# Patient Record
Sex: FEMALE | Race: WHITE | NOT HISPANIC OR LATINO | Employment: FULL TIME | URBAN - NONMETROPOLITAN AREA
[De-identification: names, ages, dates, MRNs, and addresses within clinical notes are randomized per-mention and may not be internally consistent; named-entity substitution may affect disease eponyms.]

---

## 2023-02-27 PROBLEM — E55.9 VITAMIN D DEFICIENCY: Status: ACTIVE | Noted: 2023-02-27

## 2023-02-27 PROBLEM — R50.9 FEVER: Status: RESOLVED | Noted: 2023-02-27 | Resolved: 2023-02-27

## 2023-02-27 PROBLEM — I10 HTN (HYPERTENSION): Status: ACTIVE | Noted: 2023-02-27

## 2023-02-27 PROBLEM — E53.8 VITAMIN B12 DEFICIENCY: Status: ACTIVE | Noted: 2023-02-27

## 2023-02-27 PROBLEM — G37.3 TRANSVERSE MYELITIS (MULTI): Status: ACTIVE | Noted: 2023-02-27

## 2023-02-27 PROBLEM — N85.6 ASHERMAN SYNDROME: Status: ACTIVE | Noted: 2023-02-27

## 2023-02-27 PROBLEM — R93.89 ABNORMAL MRI: Status: RESOLVED | Noted: 2023-02-27 | Resolved: 2023-02-27

## 2023-02-27 PROBLEM — C73 THYROID CANCER (MULTI): Status: ACTIVE | Noted: 2023-02-27

## 2023-02-27 PROBLEM — G43.909 MIGRAINE HEADACHE: Status: ACTIVE | Noted: 2023-02-27

## 2023-02-27 PROBLEM — M25.50 MULTIPLE JOINT PAIN: Status: ACTIVE | Noted: 2023-02-27

## 2023-02-27 PROBLEM — R20.2 NUMBNESS AND TINGLING OF BOTH UPPER EXTREMITIES: Status: ACTIVE | Noted: 2023-02-27

## 2023-02-27 PROBLEM — R05.9 COUGH: Status: RESOLVED | Noted: 2023-02-27 | Resolved: 2023-02-27

## 2023-02-27 PROBLEM — G62.9 NEUROPATHY: Status: ACTIVE | Noted: 2023-02-27

## 2023-02-27 PROBLEM — N97.1: Status: ACTIVE | Noted: 2023-02-27

## 2023-02-27 PROBLEM — R51.9 HEADACHE: Status: ACTIVE | Noted: 2023-02-27

## 2023-02-27 PROBLEM — R20.0 NUMBNESS AND TINGLING OF BOTH UPPER EXTREMITIES: Status: ACTIVE | Noted: 2023-02-27

## 2023-02-27 PROBLEM — G35 MULTIPLE SCLEROSIS (MULTI): Status: ACTIVE | Noted: 2023-02-27

## 2023-02-27 PROBLEM — E03.9 HYPOTHYROIDISM: Status: ACTIVE | Noted: 2023-02-27

## 2023-02-27 PROBLEM — J06.9 VIRAL URI WITH COUGH: Status: RESOLVED | Noted: 2023-02-27 | Resolved: 2023-02-27

## 2023-02-27 PROBLEM — M54.2 NECK PAIN: Status: ACTIVE | Noted: 2023-02-27

## 2023-02-27 RX ORDER — LEVOTHYROXINE SODIUM 137 UG/1
1 TABLET ORAL DAILY
COMMUNITY
Start: 2018-06-08

## 2023-02-27 RX ORDER — ACETAMINOPHEN, DEXTROMETHORPHAN HBR, DOXYLAMINE SUCCINATE, PHENYLEPHRINE HCL 650; 20; 12.5; 1 MG/30ML; MG/30ML; MG/30ML; MG/30ML
SOLUTION ORAL
COMMUNITY
Start: 2021-12-30

## 2023-02-27 RX ORDER — LISINOPRIL 10 MG/1
20 TABLET ORAL DAILY
COMMUNITY
Start: 2022-09-23 | End: 2023-03-28

## 2023-02-27 RX ORDER — ERGOCALCIFEROL 1.25 MG/1
1 CAPSULE ORAL
COMMUNITY
Start: 2021-06-21

## 2023-03-19 ENCOUNTER — TELEPHONE (OUTPATIENT)
Dept: PRIMARY CARE | Facility: CLINIC | Age: 43
End: 2023-03-19
Payer: COMMERCIAL

## 2023-03-19 NOTE — TELEPHONE ENCOUNTER
"Concern for high blood pressure reading - 158/95. Took BP several times  \"about 100 times\" tonight with 140s/80s- 150s/90.   Missed lisinopril dose yesterday. Took dose tonight at 5pm.   Mild global headache tonight. No focal neuro deficits. No oliguria or SOB or CP. No visual changes.     Stay on same consistent dosing schedule at same 20mg every day dose lisinopril. No changes. Can call PCP for BP check this week in office.  "

## 2023-03-21 ENCOUNTER — TELEPHONE (OUTPATIENT)
Dept: PRIMARY CARE | Facility: CLINIC | Age: 43
End: 2023-03-21
Payer: COMMERCIAL

## 2023-03-21 NOTE — TELEPHONE ENCOUNTER
PT calling back she left a message Tues and never received a call back. She would like to know what to do.    Pt calling her bp has been running 150//95. She takes Lisinopril 20mg. She isn't sure if she needs an appointment or just increase her meds.

## 2023-03-28 ENCOUNTER — OFFICE VISIT (OUTPATIENT)
Dept: PRIMARY CARE | Facility: CLINIC | Age: 43
End: 2023-03-28
Payer: COMMERCIAL

## 2023-03-28 VITALS
SYSTOLIC BLOOD PRESSURE: 147 MMHG | BODY MASS INDEX: 33.13 KG/M2 | HEART RATE: 87 BPM | OXYGEN SATURATION: 98 % | WEIGHT: 181.13 LBS | DIASTOLIC BLOOD PRESSURE: 91 MMHG

## 2023-03-28 DIAGNOSIS — I10 HYPERTENSION, UNSPECIFIED TYPE: Primary | ICD-10-CM

## 2023-03-28 DIAGNOSIS — R51.9 ACUTE NONINTRACTABLE HEADACHE, UNSPECIFIED HEADACHE TYPE: ICD-10-CM

## 2023-03-28 PROCEDURE — 99213 OFFICE O/P EST LOW 20 MIN: CPT | Performed by: FAMILY MEDICINE

## 2023-03-28 PROCEDURE — 1036F TOBACCO NON-USER: CPT | Performed by: FAMILY MEDICINE

## 2023-03-28 PROCEDURE — 3077F SYST BP >= 140 MM HG: CPT | Performed by: FAMILY MEDICINE

## 2023-03-28 PROCEDURE — 3080F DIAST BP >= 90 MM HG: CPT | Performed by: FAMILY MEDICINE

## 2023-03-28 RX ORDER — LISINOPRIL 20 MG/1
20 TABLET ORAL DAILY
Qty: 90 TABLET | Refills: 1
Start: 2023-03-28 | End: 2023-05-06 | Stop reason: SDUPTHER

## 2023-03-28 ASSESSMENT — ENCOUNTER SYMPTOMS
ABDOMINAL PAIN: 0
NUMBNESS: 0
DIARRHEA: 0
NAUSEA: 0
DYSURIA: 0
UNEXPECTED WEIGHT CHANGE: 0
DIFFICULTY URINATING: 0
FEVER: 0
COUGH: 0
WEAKNESS: 0
DIZZINESS: 0
TROUBLE SWALLOWING: 0
BLOOD IN STOOL: 0
CHILLS: 0
CONFUSION: 0
VOMITING: 0
WHEEZING: 0
HYPERTENSION: 1
LIGHT-HEADEDNESS: 0
SHORTNESS OF BREATH: 0

## 2023-03-28 NOTE — PATIENT INSTRUCTIONS
Please bring your blood pressure cuff in to the office to compare against our cuff, to ensure your cuff's accuracy.    Please keep track of your blood pressure (BP) and heart rate (HR) at home, and call the office in 2 weeks with your numbers.    Please return for a follow-up appointment in 3 months, earlier if any question or concern.   
oligohydramnios

## 2023-03-28 NOTE — PROGRESS NOTES
Subjective   Patient ID: Marilou Singh is a 43 y.o. female who presents for Hypertension (Pt presents complaining of blood pressure being high, she has not checked it but a few times when she felt it was high.BL).  Hypertension  Pertinent negatives include no chest pain or shortness of breath.     C/o headache for 2 days, felt as if her BP was high. Checked. -160 for a week, the week after a week of vacation, yesterday now down to 133 SBP. During vacation, was eating out at restaurants a lot and drinking much more caffeine than usual. Had headache for 2 days after returning from vacation and stopping the excess caffeine consumption.   Denies headache currently, chest pain, SOB, dyspnea, faintness/dizziness/lightheadedness, NV.     Has been taking Lisinopril 20 mg as prescribed. Had not been checking BP for a while.    Review of Systems   Constitutional:  Negative for chills, fever and unexpected weight change.   HENT:  Negative for ear pain and trouble swallowing.    Respiratory:  Negative for cough, shortness of breath and wheezing.    Cardiovascular:  Negative for chest pain.   Gastrointestinal:  Negative for abdominal pain, blood in stool, diarrhea, nausea and vomiting.   Genitourinary:  Negative for difficulty urinating and dysuria.   Skin:  Negative for rash.   Neurological:  Negative for dizziness, syncope, weakness, light-headedness and numbness.   Psychiatric/Behavioral:  Negative for behavioral problems and confusion.        Objective   Physical Exam  Vitals and nursing note reviewed.   Constitutional:       General: She is not in acute distress.     Appearance: Normal appearance.   HENT:      Head: Normocephalic and atraumatic.   Eyes:      General: No scleral icterus.     Extraocular Movements: Extraocular movements intact.      Conjunctiva/sclera: Conjunctivae normal.   Cardiovascular:      Rate and Rhythm: Normal rate and regular rhythm.      Heart sounds: Normal heart sounds.   Pulmonary:       Effort: Pulmonary effort is normal.      Breath sounds: Normal breath sounds.   Skin:     General: Skin is warm and dry.      Coloration: Skin is not jaundiced.   Neurological:      Mental Status: She is alert and oriented to person, place, and time. Mental status is at baseline.   Psychiatric:         Mood and Affect: Mood normal.         Thought Content: Thought content normal.         Assessment/Plan   Diagnoses and all orders for this visit:  Hypertension, unspecified type  Comments:  Transient increase likely secondary to increased caffeine and sodium intake related to vacation. Monitor. May consider increasing Lisinopril.  Orders:  -     lisinopril 20 mg tablet; Take 1 tablet (20 mg) by mouth once daily.  Acute nonintractable headache, unspecified headache type  Comments:  Resolved. Secondary to caffeine withdrawal +/- mild hypertension.  Other orders  -     Follow Up In Primary Care; Future

## 2023-05-06 ENCOUNTER — TELEPHONE (OUTPATIENT)
Dept: PRIMARY CARE | Facility: CLINIC | Age: 43
End: 2023-05-06
Payer: COMMERCIAL

## 2023-05-06 DIAGNOSIS — I10 HYPERTENSION, UNSPECIFIED TYPE: ICD-10-CM

## 2023-05-06 RX ORDER — LISINOPRIL 20 MG/1
20 TABLET ORAL DAILY
Qty: 30 TABLET | Refills: 1 | Status: SHIPPED | OUTPATIENT
Start: 2023-05-06 | End: 2023-06-26

## 2023-05-06 NOTE — TELEPHONE ENCOUNTER
Pt calling for refill she is out of meds.    Lisinopril 20mg  1 PO every day  30 DAYS    [employer]

## 2023-06-16 LAB
THYROTROPIN (MIU/L) IN SER/PLAS BY DETECTION LIMIT <= 0.05 MIU/L: 0.11 MIU/L (ref 0.44–3.98)
THYROXINE (T4) FREE (NG/DL) IN SER/PLAS: 1.73 NG/DL (ref 0.61–1.12)

## 2023-06-19 LAB
THYROGLOBULIN AB (IU/ML) IN SER/PLAS: <0.9 IU/ML (ref 0–4)
THYROGLOBULIN LC-MS/MS: ABNORMAL NG/ML (ref 1.3–31.8)
THYROGLOBULIN: 0.1 NG/ML (ref 1.3–31.8)

## 2023-06-23 PROBLEM — K13.0 LESION OF LIP: Status: ACTIVE | Noted: 2023-06-23

## 2023-06-26 DIAGNOSIS — I10 HYPERTENSION, UNSPECIFIED TYPE: ICD-10-CM

## 2023-06-26 RX ORDER — LISINOPRIL 20 MG/1
TABLET ORAL
Qty: 30 TABLET | Refills: 0 | Status: SHIPPED | OUTPATIENT
Start: 2023-06-26 | End: 2023-06-28 | Stop reason: SDUPTHER

## 2023-06-26 RX ORDER — LEVOTHYROXINE SODIUM 125 UG/1
125 TABLET ORAL DAILY
COMMUNITY
Start: 2023-06-20 | End: 2024-05-24

## 2023-06-28 ENCOUNTER — OFFICE VISIT (OUTPATIENT)
Dept: PRIMARY CARE | Facility: CLINIC | Age: 43
End: 2023-06-28
Payer: COMMERCIAL

## 2023-06-28 VITALS
SYSTOLIC BLOOD PRESSURE: 126 MMHG | HEIGHT: 61 IN | HEART RATE: 68 BPM | OXYGEN SATURATION: 98 % | BODY MASS INDEX: 33.49 KG/M2 | DIASTOLIC BLOOD PRESSURE: 78 MMHG | WEIGHT: 177.38 LBS

## 2023-06-28 DIAGNOSIS — I10 HYPERTENSION, UNSPECIFIED TYPE: ICD-10-CM

## 2023-06-28 DIAGNOSIS — R51.9 FACIAL PAIN: Primary | ICD-10-CM

## 2023-06-28 PROCEDURE — 1036F TOBACCO NON-USER: CPT | Performed by: FAMILY MEDICINE

## 2023-06-28 PROCEDURE — 3074F SYST BP LT 130 MM HG: CPT | Performed by: FAMILY MEDICINE

## 2023-06-28 PROCEDURE — 99214 OFFICE O/P EST MOD 30 MIN: CPT | Performed by: FAMILY MEDICINE

## 2023-06-28 PROCEDURE — 3078F DIAST BP <80 MM HG: CPT | Performed by: FAMILY MEDICINE

## 2023-06-28 RX ORDER — LISINOPRIL 20 MG/1
20 TABLET ORAL DAILY
Qty: 90 TABLET | Refills: 1 | Status: SHIPPED | OUTPATIENT
Start: 2023-06-28 | End: 2023-12-23 | Stop reason: SDUPTHER

## 2023-06-28 ASSESSMENT — ENCOUNTER SYMPTOMS
CHILLS: 0
VOMITING: 0
ABDOMINAL PAIN: 0
DIARRHEA: 0
SHORTNESS OF BREATH: 0
UNEXPECTED WEIGHT CHANGE: 0
DYSURIA: 0
LIGHT-HEADEDNESS: 0
DIFFICULTY URINATING: 0
NAUSEA: 0
WEAKNESS: 0
CONFUSION: 0
SPEECH DIFFICULTY: 0
NUMBNESS: 1
WHEEZING: 0
DIZZINESS: 0
TROUBLE SWALLOWING: 1
SORE THROAT: 0
FEVER: 0
COUGH: 0
BLOOD IN STOOL: 0

## 2023-06-28 NOTE — PROGRESS NOTES
"Subjective   Patient ID: Marilou Singh is a 43 y.o. female who presents for skin discomfort (Pt presents stating on Memorial Day weekend she started with numbness in R  side of mouth was unable to taste, thinks it is getting a little better, sees neuro tomorrow, pt then states that she had sharp needle pricking  pain above and around R eye Monday.BL).  HPI  Late May, woke up with numbness on the inside of her mouth on the right. Thinks this has started to clear up. Can feel hot and cold again.    Monday started to get pain in right upper face, a little on Tuesday. Today hasn't felt it.    Denies vision or hearing change over last few months.  Denies new rash or skin change.    Tried nothing.    Has an appointment with neurology tomorrow, Dr. Mcclain. Saw a neurologist in 2015, had some question whether or not multiple sclerosis.      Review of Systems   Constitutional:  Negative for chills, fever and unexpected weight change.   HENT:  Positive for trouble swallowing (now resolved). Negative for ear pain and sore throat.    Respiratory:  Negative for cough, shortness of breath and wheezing.    Cardiovascular:  Negative for chest pain.   Gastrointestinal:  Negative for abdominal pain, blood in stool, diarrhea, nausea and vomiting.   Genitourinary:  Negative for difficulty urinating and dysuria.   Skin:  Negative for rash.   Neurological:  Positive for numbness. Negative for dizziness, syncope, speech difficulty, weakness and light-headedness.   Psychiatric/Behavioral:  Negative for behavioral problems and confusion.          Objective   /78 Comment: ours  Pulse 68   Ht 1.543 m (5' 0.75\")   Wt 80.5 kg (177 lb 6 oz)   SpO2 98%   BMI 33.79 kg/m²     Physical Exam  Vitals and nursing note reviewed.   Constitutional:       General: She is not in acute distress.     Appearance: Normal appearance.   HENT:      Head: Normocephalic and atraumatic.      Right Ear: Tympanic membrane, ear canal and external ear normal. "      Left Ear: Tympanic membrane, ear canal and external ear normal.   Eyes:      General: No scleral icterus.     Extraocular Movements: Extraocular movements intact.      Conjunctiva/sclera: Conjunctivae normal.   Pulmonary:      Effort: Pulmonary effort is normal. No respiratory distress.   Skin:     General: Skin is warm and dry.      Coloration: Skin is not jaundiced.   Neurological:      Mental Status: She is alert and oriented to person, place, and time. Mental status is at baseline.      Cranial Nerves: Cranial nerve deficit (right inferior alveolar nerve altered sensation, perhaps slightly in a small area of CN-V1, otherwise no cranial nerve deficit; no temporal tenderness) present.   Psychiatric:         Mood and Affect: Mood normal.         Thought Content: Thought content normal.         Assessment/Plan   Problem List Items Addressed This Visit       HTN (hypertension)    Relevant Medications    lisinopril 20 mg tablet    Facial pain - Primary     Most recently symptoms in CN-V1, oral symptoms CN-V3. Unclear etiology. Keep appointment with neurology tomorrow.

## 2023-06-28 NOTE — PATIENT INSTRUCTIONS
Please return for a follow-up appointment in 6 months, earlier if any question or concern.    For assistance with scheduling referrals or consultations, please call [phone]. For laboratory, radiology, and other tests, please call [phone] ([phone] for pediatrics). Please review prescription inserts and published information for possible adverse effects. Return after testing or consultation to review results and recommendations, if symptoms persist, change, worsen, or return, or if you have any question or concern. For non-emergencies, you may call the office. For emergencies, call 9-1-1 or go to the nearest Emergency Department. Please schedule additional appointment(s) to address concern(s) not addressed today.    In general, results will not be released or discussed over the telephone. Results of tests done through [facility] are released via  [employer]:  [email or web address]    Until we complete our transition to the new system, additional information can be found at [email or web address] or on your Android or iOS (iPhone, iPad) device using the [email or web address] luly available free of charge in your device's luly store.

## 2023-06-28 NOTE — ASSESSMENT & PLAN NOTE
Most recently symptoms in CN-V1, oral symptoms CN-V3. Unclear etiology. Keep appointment with neurology tomorrow.

## 2023-07-25 LAB
CALCIDIOL (25 OH VITAMIN D3) (NG/ML) IN SER/PLAS: 40 NG/ML
COBALAMIN (VITAMIN B12) (PG/ML) IN SER/PLAS: 981 PG/ML (ref 211–911)

## 2023-08-29 LAB — THYROTROPIN (MIU/L) IN SER/PLAS BY DETECTION LIMIT <= 0.05 MIU/L: 1.24 MIU/L (ref 0.44–3.98)

## 2023-11-13 ENCOUNTER — TELEPHONE (OUTPATIENT)
Dept: NEUROLOGY | Facility: CLINIC | Age: 43
End: 2023-11-13
Payer: COMMERCIAL

## 2023-11-13 DIAGNOSIS — G35 MULTIPLE SCLEROSIS (MULTI): Primary | ICD-10-CM

## 2023-11-13 NOTE — PROGRESS NOTES
Chief complaint:        History of Present Illness:       There were no vitals taken for this visit.     Physical examination:      Neurologic Exam       No diagnosis found.       No orders of the defined types were placed in this encounter.         Impression and Plan:      Kiah Mcclain MD

## 2023-11-14 ENCOUNTER — TELEPHONE (OUTPATIENT)
Dept: NEUROLOGY | Facility: CLINIC | Age: 43
End: 2023-11-14
Payer: COMMERCIAL

## 2023-12-22 DIAGNOSIS — I10 HYPERTENSION, UNSPECIFIED TYPE: ICD-10-CM

## 2023-12-22 PROBLEM — R42 VERTIGO: Status: ACTIVE | Noted: 2023-12-22

## 2023-12-22 PROBLEM — R20.0 RIGHT FACIAL NUMBNESS: Status: ACTIVE | Noted: 2023-12-22

## 2023-12-22 RX ORDER — CHOLECALCIFEROL (VITAMIN D3) 50 MCG
TABLET ORAL
COMMUNITY
Start: 2023-06-29

## 2023-12-23 RX ORDER — LISINOPRIL 20 MG/1
20 TABLET ORAL DAILY
Qty: 30 TABLET | Refills: 0 | Status: SHIPPED | OUTPATIENT
Start: 2023-12-23 | End: 2024-02-09 | Stop reason: SDUPTHER

## 2024-02-05 ENCOUNTER — HOSPITAL ENCOUNTER (OUTPATIENT)
Dept: RADIOLOGY | Facility: HOSPITAL | Age: 44
Discharge: HOME | End: 2024-02-05
Payer: COMMERCIAL

## 2024-02-05 DIAGNOSIS — G35 MULTIPLE SCLEROSIS (MULTI): ICD-10-CM

## 2024-02-05 PROCEDURE — 70553 MRI BRAIN STEM W/O & W/DYE: CPT

## 2024-02-05 PROCEDURE — 70553 MRI BRAIN STEM W/O & W/DYE: CPT | Performed by: RADIOLOGY

## 2024-02-05 PROCEDURE — A9575 INJ GADOTERATE MEGLUMI 0.1ML: HCPCS | Performed by: PSYCHIATRY & NEUROLOGY

## 2024-02-05 PROCEDURE — 2500000004 HC RX 250 GENERAL PHARMACY W/ HCPCS (ALT 636 FOR OP/ED): Performed by: PSYCHIATRY & NEUROLOGY

## 2024-02-05 RX ORDER — GADOTERATE MEGLUMINE 376.9 MG/ML
15 INJECTION INTRAVENOUS
Status: COMPLETED | OUTPATIENT
Start: 2024-02-05 | End: 2024-02-05

## 2024-02-05 RX ADMIN — GADOTERATE MEGLUMINE 15 ML: 376.9 INJECTION INTRAVENOUS at 16:31

## 2024-02-09 ENCOUNTER — TELEPHONE (OUTPATIENT)
Dept: PRIMARY CARE | Facility: CLINIC | Age: 44
End: 2024-02-09
Payer: COMMERCIAL

## 2024-02-09 DIAGNOSIS — I10 HYPERTENSION, UNSPECIFIED TYPE: ICD-10-CM

## 2024-02-11 RX ORDER — LISINOPRIL 20 MG/1
20 TABLET ORAL DAILY
Qty: 90 TABLET | Refills: 0 | Status: SHIPPED | OUTPATIENT
Start: 2024-02-11 | End: 2024-05-06

## 2024-02-12 ENCOUNTER — OFFICE VISIT (OUTPATIENT)
Dept: NEUROLOGY | Facility: CLINIC | Age: 44
End: 2024-02-12
Payer: COMMERCIAL

## 2024-02-12 DIAGNOSIS — G35 MULTIPLE SCLEROSIS (MULTI): Primary | ICD-10-CM

## 2024-02-12 PROCEDURE — 1036F TOBACCO NON-USER: CPT | Performed by: PSYCHIATRY & NEUROLOGY

## 2024-02-12 PROCEDURE — 99214 OFFICE O/P EST MOD 30 MIN: CPT | Performed by: PSYCHIATRY & NEUROLOGY

## 2024-02-12 NOTE — PROGRESS NOTES
Chief complaint:    MS    History of Present Illness:   Marilou feels that she has fully recovered from the MS attack last summer that caused numbness on her right face and mouth. Sometimes she wonders if her sense of taste is not quite normal. Her  once commented that the food she cooked was very spicy, which she did not agree with, but also food that she thought was very spicy was not to her . She got Covid three years ago with relatively mild symptoms that did not affect taste or smell.    She has not had new symptoms. Vision is good. She has not had trouble walking. No problems with bladder control. 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 She does not feel that she has unusual fatigue. She generally sleeps well although her  may wake up and even a slight crack of light will wake her up.       Physical examination:  She is a pleasant, healthy-appearing woman     Neurologic Exam     Mental Status   She spoke with an accent but her speech was fluent and easy to understand. She had no trouble answering questions and following directions.      Cranial Nerves     CN VII   Facial expression full, symmetric.     Gait, Coordination, and Reflexes Her gait was narrow-based and steady.           1. Multiple sclerosis (CMS/HCC)               No orders of the defined types were placed in this encounter.         Impression and Plan:  Marilou had an episode of transverse myelitis in 2014 and an episode of right facial numbness in 2023. MRI of the brain in 2023 showed an enhancing lesion in the right superior cerebellar peduncle. We reviewed her recent MRI of the brain which showed no new lesion and no enhancing lesions. Her overall lesion burden is mild and she is asymptomatic. We again discussed the role of medication to prevent further attacks. Marilou prefers to wait and see how she does with serial MRIs of the brain. She takes her vitamins  B12 and D. I encouraged her to work on losing 10-20 pounds. We discussed the importance of good quality sleep. I will refer her to a new MS specialist since I will be retiring in September.     Kiah Mcclain MD

## 2024-02-12 NOTE — PATIENT INSTRUCTIONS
Your recent MRI of the brain looked good. There are no new MS lesions compared to last summer and there was no active inflammation although there is still a small scar from the MS flareup you had last year. Your MS is mild, and do not want to start MS medication right now. Please make sure you take vitamin B12 and vitamin D every day. Make sure you get enough sleep every day. Consider using an eye mask when you sleep since light can wake you up. Train your family to wash their hands when they return home. It would be very helpful to lose 10-20 pounds since extra fat causes inflammation.     I will retire at the end of September. We need to find you a new neurologist who specializes in MS. We will help you find one before I retire.

## 2024-05-05 DIAGNOSIS — I10 HYPERTENSION, UNSPECIFIED TYPE: ICD-10-CM

## 2024-05-06 ENCOUNTER — APPOINTMENT (OUTPATIENT)
Dept: PRIMARY CARE | Facility: CLINIC | Age: 44
End: 2024-05-06
Payer: COMMERCIAL

## 2024-05-06 RX ORDER — LISINOPRIL 20 MG/1
20 TABLET ORAL DAILY
Qty: 90 TABLET | Refills: 0 | Status: SHIPPED | OUTPATIENT
Start: 2024-05-06

## 2024-05-24 DIAGNOSIS — E03.9 HYPOTHYROIDISM, UNSPECIFIED TYPE: Primary | ICD-10-CM

## 2024-05-24 RX ORDER — LEVOTHYROXINE SODIUM 125 UG/1
125 TABLET ORAL DAILY
Qty: 90 TABLET | Refills: 0 | Status: SHIPPED | OUTPATIENT
Start: 2024-05-24

## 2024-07-02 PROBLEM — E66.9 OBESITY WITH BODY MASS INDEX 30 OR GREATER: Status: ACTIVE | Noted: 2024-07-02

## 2024-07-02 PROBLEM — S99.929A INJURY OF TOE: Status: RESOLVED | Noted: 2024-07-02 | Resolved: 2024-07-02

## 2024-07-02 PROBLEM — M47.812 SPONDYLOSIS OF CERVICAL SPINE: Status: ACTIVE | Noted: 2024-07-02

## 2024-07-08 ENCOUNTER — APPOINTMENT (OUTPATIENT)
Dept: PRIMARY CARE | Facility: CLINIC | Age: 44
End: 2024-07-08
Payer: COMMERCIAL

## 2024-07-08 VITALS
HEIGHT: 60 IN | WEIGHT: 177.4 LBS | HEART RATE: 78 BPM | OXYGEN SATURATION: 97 % | DIASTOLIC BLOOD PRESSURE: 82 MMHG | SYSTOLIC BLOOD PRESSURE: 117 MMHG | BODY MASS INDEX: 34.83 KG/M2

## 2024-07-08 DIAGNOSIS — I10 HYPERTENSION, UNSPECIFIED TYPE: ICD-10-CM

## 2024-07-08 DIAGNOSIS — I10 PRIMARY HYPERTENSION: ICD-10-CM

## 2024-07-08 DIAGNOSIS — E55.9 VITAMIN D DEFICIENCY: ICD-10-CM

## 2024-07-08 DIAGNOSIS — R40.0 DAYTIME SOMNOLENCE: ICD-10-CM

## 2024-07-08 DIAGNOSIS — E53.8 VITAMIN B12 DEFICIENCY: ICD-10-CM

## 2024-07-08 DIAGNOSIS — Z00.00 WELL ADULT HEALTH CHECK: Primary | ICD-10-CM

## 2024-07-08 PROCEDURE — 3079F DIAST BP 80-89 MM HG: CPT | Performed by: FAMILY MEDICINE

## 2024-07-08 PROCEDURE — 99214 OFFICE O/P EST MOD 30 MIN: CPT | Performed by: FAMILY MEDICINE

## 2024-07-08 PROCEDURE — 99396 PREV VISIT EST AGE 40-64: CPT | Performed by: FAMILY MEDICINE

## 2024-07-08 PROCEDURE — 3074F SYST BP LT 130 MM HG: CPT | Performed by: FAMILY MEDICINE

## 2024-07-08 PROCEDURE — 1036F TOBACCO NON-USER: CPT | Performed by: FAMILY MEDICINE

## 2024-07-08 RX ORDER — LISINOPRIL 20 MG/1
20 TABLET ORAL DAILY
Qty: 90 TABLET | Refills: 3 | Status: SHIPPED | OUTPATIENT
Start: 2024-07-08

## 2024-07-08 ASSESSMENT — ENCOUNTER SYMPTOMS
FEVER: 0
CHILLS: 0
LIGHT-HEADEDNESS: 0
UNEXPECTED WEIGHT CHANGE: 0
CHEST TIGHTNESS: 0
APNEA: 0
WHEEZING: 0
HEADACHES: 0
CHOKING: 0
SHORTNESS OF BREATH: 0
COUGH: 0
PALPITATIONS: 0
DIAPHORESIS: 0
DIZZINESS: 0

## 2024-07-08 ASSESSMENT — PATIENT HEALTH QUESTIONNAIRE - PHQ9
2. FEELING DOWN, DEPRESSED OR HOPELESS: NOT AT ALL
SUM OF ALL RESPONSES TO PHQ9 QUESTIONS 1 AND 2: 0
1. LITTLE INTEREST OR PLEASURE IN DOING THINGS: NOT AT ALL

## 2024-07-08 NOTE — ASSESSMENT & PLAN NOTE
44yF with MS s/p thyroidectomy for thyroid cancer, doing fairly well.    Declines vaccines. Will see gynecology for cervical and breast cancer screening as indicated.

## 2024-07-08 NOTE — PATIENT INSTRUCTIONS
Monitor your resting blood pressure (BP) and heart rate (HR) at home. Resting BP should be fairly consistently better than 140/90, preferably better than 130/80. Please bring your blood pressure cuff to your appointments.  For a list of validated BP cuffs: https://www.validatebp.org/      Please return for a(n) sleep study results follow-up appointment in 2-3 months, after tests to review results and options, earlier if any question or concern. Please return for your next Wellness visit in 12 months. Please schedule additional problem-focused appointment(s) to address additional problem(s).    Recommended vaccines:  Influenza, annual  TDaP (tetanus-diphtheria-pertussis) vaccine  Avoid taking Biotin (a vitamin, shows up particularly in hair/nail supplements) for a week prior to any blood tests, as it can interfere with certain results. Fasting for labs means 12 hours, nothing to eat or drink, except water and medications, unless directed otherwise.    For assistance with scheduling referrals or consultations, please call [phone]. For laboratory, radiology, and other tests, please call [phone] ([phone] for pediatrics). Please review prescription inserts and published information for possible adverse effects of all medications. Return after testing or consultation to review results and recommendations, if symptoms persist, change, worsen, or return, or if you have any question or concern. If you do not get results within 7-10 days, or you have any question or concern, please send a message, call the office ([phone]), or return to the office for a follow-up appointment. For non-emergencies, you may call the office. For emergencies, call 9-1-1 or go to the nearest Emergency Department. Please schedule additional appointment(s) to address concern(s) not addressed today.    In general, results are not released or discussed over the telephone, but at an appointment or via [facility]. Results of tests  done through [facility] are released via  [employer] (see below).  [email or web address]   [employer] support line: [phone]

## 2024-07-08 NOTE — PROGRESS NOTES
"Subjective   Patient ID: Marilou Singh is a 44 y.o. female h/o MS, s/p thyroidectomy for thyroid cancer, who presents for Follow-up (Medication follow up, pt has no concerns.  Pt questions whether she could do just a yearly check up instead of medication follow ups/).  HPI Historian(s): Self    Generally feeling well.    c/o fatigue. Denies daytime somnolence.    Review of Systems   Constitutional:  Negative for chills, diaphoresis, fever and unexpected weight change.   Eyes:  Negative for visual disturbance.   Respiratory:  Negative for apnea (no PND), cough, choking, chest tightness, shortness of breath and wheezing.    Cardiovascular:  Negative for chest pain, palpitations and leg swelling.   Neurological:  Negative for dizziness, syncope, light-headedness and headaches.   All other systems reviewed and are negative.      Patient Care Team:  Beni Campos DO as PCP - General (Family Medicine)  Kiah Mcclain MD as Referring Physician (Neurology)  Luiza Zarco MD as Consulting Physician (Endocrinology)  Jeremy Townsend MD as Referring Physician (Obstetrics and Gynecology)  Roverto Aguillon MD as On Call Attending Physician (Neurology)    Objective   /82   Pulse 78   Ht 1.526 m (5' 0.06\")   Wt 80.5 kg (177 lb 6.4 oz)   SpO2 97%   BMI 34.57 kg/m²         Physical Exam  Vitals and nursing note reviewed.   Constitutional:       General: She is not in acute distress.     Appearance: Normal appearance. She is well-developed. She is not diaphoretic.      Comments: No assistive device presently being used.   HENT:      Head: Normocephalic and atraumatic.      Nose: Nose normal.   Eyes:      General: No scleral icterus.     Extraocular Movements: Extraocular movements intact.      Conjunctiva/sclera: Conjunctivae normal.   Neck:      Thyroid: No thyromegaly (absent).      Vascular: No carotid bruit or JVD.   Cardiovascular:      Rate and Rhythm: Normal rate and regular rhythm.      Heart sounds: " Normal heart sounds.   Pulmonary:      Effort: Pulmonary effort is normal. No respiratory distress.      Breath sounds: Normal breath sounds. No wheezing, rhonchi or rales.   Abdominal:      General: Bowel sounds are normal. There is no distension.      Palpations: Abdomen is soft. There is no mass.      Tenderness: There is no abdominal tenderness. There is no guarding or rebound.   Musculoskeletal:      Cervical back: Normal range of motion. No tenderness.      Right lower leg: No edema.      Left lower leg: No edema.   Skin:     General: Skin is warm and dry.      Coloration: Skin is not jaundiced.   Neurological:      General: No focal deficit present.      Mental Status: She is alert and oriented to person, place, and time. Mental status is at baseline.   Psychiatric:         Mood and Affect: Mood normal.         Behavior: Behavior normal.         Thought Content: Thought content normal.         Assessment/Plan   Problem List Items Addressed This Visit       HTN (hypertension)    Current Assessment & Plan     Well controlled.          Relevant Medications    lisinopril 20 mg tablet    Other Relevant Orders    Comprehensive Metabolic Panel    Vitamin B12 deficiency    Relevant Orders    Folate    Vitamin B12    Vitamin D deficiency    Relevant Orders    Vitamin D 25-Hydroxy,Total (for eval of Vitamin D levels)    Well adult health check - Primary    Current Assessment & Plan     44yF with MS s/p thyroidectomy for thyroid cancer, doing fairly well.    Declines vaccines. Will see gynecology for cervical and breast cancer screening as indicated.         Relevant Orders    CBC    Daytime somnolence    Current Assessment & Plan     ESS = 9. Check HSAT.         Relevant Orders    Home sleep apnea test (HSAT)    Follow Up In Primary Care - Established           Declines:  vaccine(s) (all)

## 2024-07-18 ENCOUNTER — PROCEDURE VISIT (OUTPATIENT)
Dept: SLEEP MEDICINE | Facility: HOSPITAL | Age: 44
End: 2024-07-18
Payer: COMMERCIAL

## 2024-07-18 DIAGNOSIS — R40.0 DAYTIME SOMNOLENCE: ICD-10-CM

## 2024-07-18 PROCEDURE — 95806 SLEEP STUDY UNATT&RESP EFFT: CPT | Performed by: INTERNAL MEDICINE

## 2024-07-22 ENCOUNTER — APPOINTMENT (OUTPATIENT)
Dept: ENDOCRINOLOGY | Facility: CLINIC | Age: 44
End: 2024-07-22
Payer: COMMERCIAL

## 2024-07-22 DIAGNOSIS — C73 THYROID CANCER (MULTI): Primary | ICD-10-CM

## 2024-07-22 PROCEDURE — 99213 OFFICE O/P EST LOW 20 MIN: CPT | Performed by: INTERNAL MEDICINE

## 2024-07-22 PROCEDURE — 1036F TOBACCO NON-USER: CPT | Performed by: INTERNAL MEDICINE

## 2024-07-22 ASSESSMENT — ENCOUNTER SYMPTOMS
DIARRHEA: 0
FEVER: 0
CHILLS: 0
PALPITATIONS: 0
HEADACHES: 0
SHORTNESS OF BREATH: 0
COUGH: 0
VOMITING: 0
NAUSEA: 0
FATIGUE: 0

## 2024-07-22 NOTE — PROGRESS NOTES
Endocrinology: Follow up visit  Subjective   Patient ID: Marilou Singh is a 44 y.o. female who presents for Hypothyroidism and Thyroid Cancer.    PCP: Beni Campos DO    HPI  Last seen a year ago for hypo and thyroid cancer. Last year adjusted t4 dose.  Recheck labs ok.   C/o fatigue on/off.  Doesn't sleep well as kids stay up late and she has to get up early  Taking t4 as directed    I performed this visit using real-time telehealth tools, including an audio/video connection between the patient at their home and Dr Luiza Zarco [place], [place].    Last surveillance  2021 negative after 5 yrs of surveillance      Review of Systems   Constitutional:  Negative for chills, fatigue and fever.   Respiratory:  Negative for cough and shortness of breath.    Cardiovascular:  Negative for chest pain and palpitations.   Gastrointestinal:  Negative for diarrhea, nausea and vomiting.   Neurological:  Negative for headaches.       Patient Active Problem List   Diagnosis    Asherman syndrome    HTN (hypertension)    Hypothyroidism    Migraine headache    Multiple joint pain    Multiple sclerosis (Multi)    Headache    Neck pain    Neuropathy    Numbness and tingling of both upper extremities    Obstruction of fallopian tube, acquired    Thyroid cancer (Multi)    Transverse myelitis (Multi)    Vitamin B12 deficiency    Vitamin D deficiency    Lesion of lip    Facial pain    Vertigo    Right facial numbness    Spondylosis of cervical spine    Obesity with body mass index 30 or greater    Well adult health check    Daytime somnolence        Home Meds:  Current Outpatient Medications   Medication Instructions    cholecalciferol (Vitamin D-3) 50 MCG (2000 UT) tablet Vitamin D 50 MCG (2000 UT) Oral Tablet  Refills: 0 MD Kiah Mcclain Start: 29-Jun-2023    cyanocobalamin, vitamin B-12, (Vitamin B-12) 1,000 mcg tablet extended release TAKE 1 TABLET A DAY    levothyroxine (SYNTHROID, LEVOXYL) 125 mcg, oral, Daily, as directed     "lisinopril 20 mg, oral, Daily        Allergies   Allergen Reactions    Theraflu Flu And Cold Swelling        Objective   There were no vitals filed for this visit.   There were no vitals filed for this visit.   There is no height or weight on file to calculate BMI.       Labs:  Lab Results   Component Value Date    TSH 1.24 08/29/2023    FREET4 1.73 (H) 06/16/2023      No results found for: \"PR1\", \"THYROIDPAB\", \"TSI\"     Assessment/Plan   Assessment & Plan  Thyroid cancer (Multi)    Orders:    Thyroglobulin and Antithyroglobulin; Future    TSH with reflex to Free T4 if abnormal; Future    Discussed course.   Due for tsh + tg  Passed 5 yrs of surveillance so no need for us as long as tg ok  Follow up in one year    Electronically signed by:  Luiza Zarco MD 07/22/24 11:22 AM              "

## 2024-07-22 NOTE — ASSESSMENT & PLAN NOTE
Orders:    Thyroglobulin and Antithyroglobulin; Future    TSH with reflex to Free T4 if abnormal; Future

## 2024-08-05 ENCOUNTER — LAB (OUTPATIENT)
Dept: LAB | Facility: LAB | Age: 44
End: 2024-08-05
Payer: COMMERCIAL

## 2024-08-05 DIAGNOSIS — Z00.00 WELL ADULT HEALTH CHECK: ICD-10-CM

## 2024-08-05 DIAGNOSIS — C73 THYROID CANCER (MULTI): ICD-10-CM

## 2024-08-05 DIAGNOSIS — E53.8 VITAMIN B12 DEFICIENCY: ICD-10-CM

## 2024-08-05 DIAGNOSIS — I10 HYPERTENSION, UNSPECIFIED TYPE: ICD-10-CM

## 2024-08-05 DIAGNOSIS — E55.9 VITAMIN D DEFICIENCY: ICD-10-CM

## 2024-08-05 LAB
25(OH)D3 SERPL-MCNC: 45 NG/ML (ref 30–100)
ALBUMIN SERPL BCP-MCNC: 4 G/DL (ref 3.4–5)
ALP SERPL-CCNC: 38 U/L (ref 33–110)
ALT SERPL W P-5'-P-CCNC: 8 U/L (ref 7–45)
ANION GAP SERPL CALC-SCNC: 9 MMOL/L (ref 10–20)
AST SERPL W P-5'-P-CCNC: 16 U/L (ref 9–39)
BILIRUB SERPL-MCNC: 0.8 MG/DL (ref 0–1.2)
BUN SERPL-MCNC: 12 MG/DL (ref 6–23)
CALCIUM SERPL-MCNC: 8.5 MG/DL (ref 8.6–10.3)
CHLORIDE SERPL-SCNC: 104 MMOL/L (ref 98–107)
CO2 SERPL-SCNC: 29 MMOL/L (ref 21–32)
CREAT SERPL-MCNC: 0.88 MG/DL (ref 0.5–1.05)
EGFRCR SERPLBLD CKD-EPI 2021: 83 ML/MIN/1.73M*2
ERYTHROCYTE [DISTWIDTH] IN BLOOD BY AUTOMATED COUNT: 11.9 % (ref 11.5–14.5)
FOLATE SERPL-MCNC: 18.2 NG/ML
GLUCOSE SERPL-MCNC: 91 MG/DL (ref 74–99)
HCT VFR BLD AUTO: 42.4 % (ref 36–46)
HGB BLD-MCNC: 13.7 G/DL (ref 12–16)
MCH RBC QN AUTO: 29 PG (ref 26–34)
MCHC RBC AUTO-ENTMCNC: 32.3 G/DL (ref 32–36)
MCV RBC AUTO: 90 FL (ref 80–100)
NRBC BLD-RTO: 0 /100 WBCS (ref 0–0)
PLATELET # BLD AUTO: 278 X10*3/UL (ref 150–450)
POTASSIUM SERPL-SCNC: 4.1 MMOL/L (ref 3.5–5.3)
PROT SERPL-MCNC: 6.8 G/DL (ref 6.4–8.2)
RBC # BLD AUTO: 4.72 X10*6/UL (ref 4–5.2)
SODIUM SERPL-SCNC: 138 MMOL/L (ref 136–145)
T4 FREE SERPL-MCNC: 0.85 NG/DL (ref 0.61–1.12)
TSH SERPL-ACNC: 19.08 MIU/L (ref 0.44–3.98)
VIT B12 SERPL-MCNC: 605 PG/ML (ref 211–911)
WBC # BLD AUTO: 8.2 X10*3/UL (ref 4.4–11.3)

## 2024-08-05 PROCEDURE — 86800 THYROGLOBULIN ANTIBODY: CPT

## 2024-08-05 PROCEDURE — 82746 ASSAY OF FOLIC ACID SERUM: CPT

## 2024-08-05 PROCEDURE — 82306 VITAMIN D 25 HYDROXY: CPT

## 2024-08-05 PROCEDURE — 82607 VITAMIN B-12: CPT

## 2024-08-05 PROCEDURE — 84432 ASSAY OF THYROGLOBULIN: CPT

## 2024-08-05 PROCEDURE — 36415 COLL VENOUS BLD VENIPUNCTURE: CPT

## 2024-08-07 DIAGNOSIS — E03.9 HYPOTHYROIDISM, UNSPECIFIED TYPE: ICD-10-CM

## 2024-08-07 LAB
BILL ONLY-THYROGLOBULIN: NORMAL
THYROGLOB AB SERPL-ACNC: <0.9 IU/ML (ref 0–4)
THYROGLOB SERPL-MCNC: 0.1 NG/ML (ref 1.3–31.8)
THYROGLOB SERPL-MCNC: ABNORMAL NG/ML (ref 1.3–31.8)

## 2024-08-07 RX ORDER — LEVOTHYROXINE SODIUM 150 UG/1
150 TABLET ORAL DAILY
Qty: 90 TABLET | Refills: 3 | Status: SHIPPED | OUTPATIENT
Start: 2024-08-07 | End: 2025-08-07

## 2024-08-10 DIAGNOSIS — E83.51 HYPOCALCEMIA: Primary | ICD-10-CM

## 2024-08-12 ENCOUNTER — OFFICE VISIT (OUTPATIENT)
Dept: NEUROLOGY | Facility: HOSPITAL | Age: 44
End: 2024-08-12
Payer: COMMERCIAL

## 2024-08-12 ENCOUNTER — APPOINTMENT (OUTPATIENT)
Dept: PRIMARY CARE | Facility: CLINIC | Age: 44
End: 2024-08-12
Payer: COMMERCIAL

## 2024-08-12 ENCOUNTER — TELEPHONE (OUTPATIENT)
Dept: PRIMARY CARE | Facility: CLINIC | Age: 44
End: 2024-08-12

## 2024-08-12 VITALS
SYSTOLIC BLOOD PRESSURE: 148 MMHG | RESPIRATION RATE: 18 BRPM | BODY MASS INDEX: 34.95 KG/M2 | TEMPERATURE: 96.2 F | HEART RATE: 76 BPM | DIASTOLIC BLOOD PRESSURE: 93 MMHG | HEIGHT: 60 IN | WEIGHT: 178 LBS

## 2024-08-12 DIAGNOSIS — G35 MULTIPLE SCLEROSIS (MULTI): Primary | ICD-10-CM

## 2024-08-12 PROCEDURE — 3008F BODY MASS INDEX DOCD: CPT | Performed by: PSYCHIATRY & NEUROLOGY

## 2024-08-12 PROCEDURE — 99215 OFFICE O/P EST HI 40 MIN: CPT | Performed by: PSYCHIATRY & NEUROLOGY

## 2024-08-12 PROCEDURE — 1036F TOBACCO NON-USER: CPT | Performed by: PSYCHIATRY & NEUROLOGY

## 2024-08-12 PROCEDURE — 3077F SYST BP >= 140 MM HG: CPT | Performed by: PSYCHIATRY & NEUROLOGY

## 2024-08-12 PROCEDURE — 3080F DIAST BP >= 90 MM HG: CPT | Performed by: PSYCHIATRY & NEUROLOGY

## 2024-08-12 ASSESSMENT — PAIN SCALES - GENERAL: PAINLEVEL: 0-NO PAIN

## 2024-08-12 ASSESSMENT — VISUAL ACUITY: VA_NORMAL: 1

## 2024-08-12 NOTE — TELEPHONE ENCOUNTER
----- Message from Bein Campos sent at 8/10/2024 12:35 AM EDT -----  Please make sure patient is aware of the comments or MyChart message.    Your calcium level is slightly low.  Recommend checking a follow-up lab, ionized calcium, to get a more accurate calcium level.  Recommend daily calcium intake of 800 to 1200 mg from all sources, preferably from your diet.

## 2024-08-12 NOTE — PATIENT INSTRUCTIONS
I highly recommend you start MS medication to prevent future relapses and development of new brain lesions.     I understand you are worried about the risks of treatment. Vumerity is a relatively effective and safe option with minimal risks. Please read about vumerity and let me know if you would like to start it. I will also give you reading material on other options like zeposia and ocrevus.     Blood work today to ensure that it is safe to start you on one of these medications.     Please increase your vitamin D to 4000 units daily.     Repeat brain MRI wwo in December or six months after starting treatment if you decide to go on treatment.     Follow up after six weeks.     Thank you for visiting the clinic today    Roverto Aguillon MD

## 2024-08-12 NOTE — PROGRESS NOTES
Subjective     Marilou Singh is a 44 y.o. year old female with hx of HTN and hypothyroidism who is here to establish care for known MS. She previously followed with Dr. Martita Mcclain.       History of Present Illness   [facility] NEURO IMMUNOLOGY HPI: Date of Onset: 2014  Date of Diagnosis: 2023  Last MRI Brain: 2/2024  Last MRI Cervical: 2023  Last MRI Thoracis: not done  Last OCT/VEP: not done  Disease Course at Onset: RR  Disease Course Now: RR  Current Disease Modifying Therapies: None  Previous Disease Modifying Therapies: None  Cerebrospinal Fluid: not done   Marcus Puga Virus: not done   Varicella Zoster Virus: not done  Hep Panel: not done  Neuromyelitis Optica: not done  Myelin Oligodendrocyte Glycoprotein: not done    HPI  Symptoms started in 2014 with ascending numbness from the feet up the legs, trunk, and hands. She was in [place] at that time and no specific diagnosis was given after spinal and brain MRIs but she recalls getting prednisone tablets. Her symptoms improved after several months. She did well after and saw Dr. Rowland in 2021 for MRI monitoring (was asymptomatic at that time). MRI cervical and brain in 2021 showed a mild burden of non-enhancing typical demyelinating plaques in periventricular, juxtacortical, and cervical locations. She was diagnosed with MS and was referred to Dr. Martita Mcclain with who she agreed to monitor off DMT given disease stability of many years. In 2022, she developed double vision when looking to the left. She did not seek medical advice at that time and her symptoms resolved after a few months. In 2023 after a detoxification program, she developed numbness of the right side of the face and mouth. She had a brain MRI at that time that showed an enhancing lesion in the right alysia. She declined DMT at that time for fear of side effects and cost. Repeat brain MRI in February of 2024 showed stable lesions.     MS Symptom Review: Weakness: No Weakness  Sensory Changes: yes  presenting symptom in 2014, legs and arms, ascending numbness, also facial numbness in 2023   Incoordination: No incoordination   Falling: No falling  Gait Change: No gait change  Painful Vision Loss: No painful vision loss  Double Vision: in 2022, double vision upon looking to the left, lasted for several months then resolved on its own.   Vertigo: positional vertigo since 2014  Facial/Bulvar Weakness: No facial/bulvar weakness  Bladder/Bowel Dysfunction: stress incontinence only   Spasticity: No spasticity  Tonic Spasms: No tonic spasms  Tremors: gets shaky when hungry  Restless Leg Syndrome: No restless leg syndrome   Dystonia: No dystonia  Other Movement Disorders: No other movement disorders  Heat Sensitivity: No heat sensitivity  Fatigue: yes but also hypothyroid  Depression: No depression   Anxiety: No anxiety  Cognitive Changes: No cognitive changes  Disease Modifying Therapies: No Disease modifying therapies   Side Effects: No side effects  Is the patient taking Vitamin D supplements? 2000 units a day  Is the patient taking Symptomatic medications? none       Past Surgical History:   Procedure Laterality Date    OTHER SURGICAL HISTORY  09/22/2021    Fallopian tube surgical procedure    OTHER SURGICAL HISTORY  09/22/2021    Appendectomy    THYROID SURGERY  12/27/2016    Thyroid Surgery     Social History     Tobacco Use    Smoking status: Never    Smokeless tobacco: Never   Substance Use Topics    Alcohol use: Never     Allergies   Allergen Reactions    Theraflu Flu And Cold Swelling     Visit Vitals  BP (!) 148/93   Pulse 76   Temp 35.7 °C (96.2 °F)   Resp 18   Ht 1.524 m (5')   Wt 80.7 kg (178 lb)   BMI 34.76 kg/m²   OB Status Having periods   Smoking Status Never   BSA 1.85 m²       Objective   Neurological Exam  Mental Status  Awake, alert and oriented to person, place and time. Recent and remote memory are intact. Speech is normal. Language is fluent with no aphasia. Attention and concentration are  normal.    Cranial Nerves  CN II: Visual acuity is normal. Visual fields full to confrontation.  CN III, IV, VI: Extraocular movements intact bilaterally. Normal lids and orbits bilaterally.  Relative afferent pupillary defect absent.  CN V: Facial sensation is normal.  CN VII: Full and symmetric facial movement.  CN VIII: Hearing is normal.  CN IX, X: Palate elevates symmetrically  CN XI: Shoulder shrug strength is normal.  CN XII: Tongue midline without atrophy or fasciculations.    Motor  Normal muscle bulk throughout. No fasciculations present. Normal muscle tone. No abnormal involuntary movements. No pronator drift.    Sensory  Sensation is intact to light touch, pinprick, vibration and proprioception in all four extremities.    Reflexes                                            Right                      Left  Brachioradialis                    2+                         2+  Biceps                                 2+                         2+  Triceps                                2+                         2+  Patellar                                3+                         3+  Achilles                                2+                         2+    Right pathological reflexes: Ankle clonus absent.  Left pathological reflexes: Ankle clonus absent.    Coordination  Right: Finger-to-nose normal. Rapid alternating movement normal.Left: Finger-to-nose normal. Rapid alternating movement normal.    Gait  Normal casual, toe, heel and tandem gait.  Physical Exam  Eyes:      General: Lids are normal.      Extraocular Movements: Extraocular movements intact.   Neurological:      Deep Tendon Reflexes:      Reflex Scores:       Tricep reflexes are 2+ on the right side and 2+ on the left side.       Bicep reflexes are 2+ on the right side and 2+ on the left side.       Brachioradialis reflexes are 2+ on the right side and 2+ on the left side.       Patellar reflexes are 3+ on the right side and 3+ on the left side.        Achilles reflexes are 2+ on the right side and 2+ on the left side.  Psychiatric:         Speech: Speech normal.       CLINICAL SCORES  Scales and Scores: Expanded Disability Status Scale (EDSS)  Pyramidal Functions: Normal  Cerebellar Functions: Normal  Brainstem Functions: Normal  Sensory Function: Normal  Bowel and Bladder Function: Normal  Visual Function: Normal  Cerebral (or Mental) Functions: Normal   EDSS: 0    Assessment/Plan     This is a 44 y.o. 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    The Neurological Exam showed no abnormality.   The MRI Showed a low burden of non-enhancing typical demyelinating plaques in periventricular, juxtacortical, pontine, and cervical locations on the MRI from 2/2024. Brain MRI in 2023 showed the right pontine lesion to be enhancing and new compared to MRI from 2021. I personally reviewed all her MRIs.   Cerebrospinal Fluid not done and not needed  OCT/VEP not done  MS Mimics not ruled out  The overall picture is suggestive of RRMS with activity.   Disease Modifying Therapies: she needs to start a potent DMT given involvement of the brainstem and the spinal cord. She will likely be a good candidate for vumerity, zeposia, kesimpta, or ocrevus.  She is very concerned about treatment-associated risks and cost, which makes vumerity is the most suitable option for her overall.     PLAN:  - Discussion: The diagnosis of MS was discussed with the patient and family in detail. All questions answered and reading material provided.  - Acute relapse treatment: Not indicated  - DMT: I gave reading material on vumerity, ocrevus, kesimpta, and zeposia.   - Symptomatic: not indicated  - MRI: Will need periodic monitoring after DMT initiation. She requested that I order her next MRI in December.   - Blood work:  Will send blood work for MS mimics and dormant infections.   - Vitamin D: increase dose to 4000 units daily.   - PT/OT: not indicated   - Smoking: never a smoker  - Wellness: discussed low salt and healthy dieting.   - Follow up: after for six weeks to review test results and select DMT.          Orders Placed This Encounter   Procedures    MR brain w and wo IV contrast     Standing Status:   Future     Standing Expiration Date:   8/12/2025     Order Specific Question:   Reason for exam:     Answer:   MS monitoring     Order Specific Question:   Does the patient have a Cochlear Implant, Pacemaker, Defibrillator, Pacing Wire, Brain Aneurysm Clip, Implanted Nerve or Bone Graft Stimulator, Implanted Breast Tissue Expander, Glucose Monitor or Neulasta Device?     Answer:   No     Order Specific Question:   Radiologist to Determine Optimal Study     Answer:   Yes     Order Specific Question:   Release result to MyChart     Answer:   Immediate     Order Specific Question:   Is this exam part of a Research Study? If Yes, link this order to the research study     Answer:   No    CNS Demyelinating Disease,Serum     Standing Status:   Future     Standing Expiration Date:   8/12/2025     Order Specific Question:   Release result to MyChart     Answer:   Immediate [1]    CBC and Auto Differential     Standing Status:   Future     Standing Expiration Date:   8/12/2025     Order Specific Question:   Release result to MyChart     Answer:   Immediate [1]    Comprehensive Metabolic Panel     Standing Status:   Future     Standing Expiration Date:   8/12/2025     Order Specific Question:   Release result to MyChart     Answer:   Immediate [1]    Hepatitis B Core Antibody, Total     Standing Status:   Future     Standing Expiration Date:   8/12/2025     Order Specific Question:   Release result to MyChart     Answer:   Immediate [1]    Hepatitis B Surface Antibody     Standing Status:   Future     Standing Expiration Date:   8/12/2025      Order Specific Question:   Release result to MyChart     Answer:   Immediate [1]    Hepatitis B Surface Antigen     Standing Status:   Future     Standing Expiration Date:   8/12/2025     Order Specific Question:   Release result to MyChart     Answer:   Immediate [1]    Hepatitis C Antibody     Standing Status:   Future     Standing Expiration Date:   8/12/2025     Order Specific Question:   Release result to MyChart     Answer:   Immediate [1]    MAURICIO Virus Antibody with Reflex To Inhibition Assay     Standing Status:   Future     Standing Expiration Date:   8/12/2025     Order Specific Question:   Release result to MyChart     Answer:   Immediate [1]    Sars-Cov-2 Mario Antibody Total     Standing Status:   Future     Standing Expiration Date:   8/12/2025     Order Specific Question:   Release result to MyChart     Answer:   Immediate [1]    T-Spot TB     Standing Status:   Future     Standing Expiration Date:   8/12/2025     Order Specific Question:   Release result to MyChart     Answer:   Immediate [1]    Varicella Zoster Antibody, IgG     Standing Status:   Future     Standing Expiration Date:   8/12/2025     Order Specific Question:   Release result to MyChart     Answer:   Immediate [1]

## 2024-08-15 ENCOUNTER — LAB (OUTPATIENT)
Dept: LAB | Facility: LAB | Age: 44
End: 2024-08-15
Payer: COMMERCIAL

## 2024-08-15 ENCOUNTER — OFFICE VISIT (OUTPATIENT)
Dept: PRIMARY CARE | Facility: CLINIC | Age: 44
End: 2024-08-15
Payer: COMMERCIAL

## 2024-08-15 ENCOUNTER — HOSPITAL ENCOUNTER (OUTPATIENT)
Dept: RADIOLOGY | Facility: CLINIC | Age: 44
Discharge: HOME | End: 2024-08-15
Payer: COMMERCIAL

## 2024-08-15 VITALS
HEART RATE: 74 BPM | HEIGHT: 60 IN | DIASTOLIC BLOOD PRESSURE: 90 MMHG | WEIGHT: 178.6 LBS | BODY MASS INDEX: 35.06 KG/M2 | OXYGEN SATURATION: 98 % | SYSTOLIC BLOOD PRESSURE: 150 MMHG

## 2024-08-15 DIAGNOSIS — E83.51 HYPOCALCEMIA: ICD-10-CM

## 2024-08-15 DIAGNOSIS — M54.50 ACUTE MIDLINE LOW BACK PAIN WITHOUT SCIATICA: ICD-10-CM

## 2024-08-15 DIAGNOSIS — G35 MULTIPLE SCLEROSIS (MULTI): ICD-10-CM

## 2024-08-15 DIAGNOSIS — C73 THYROID CANCER (MULTI): Primary | ICD-10-CM

## 2024-08-15 DIAGNOSIS — E03.9 HYPOTHYROIDISM, UNSPECIFIED TYPE: ICD-10-CM

## 2024-08-15 DIAGNOSIS — M54.50 ACUTE MIDLINE LOW BACK PAIN WITHOUT SCIATICA: Primary | ICD-10-CM

## 2024-08-15 LAB
ALBUMIN SERPL BCP-MCNC: 4.5 G/DL (ref 3.4–5)
ALP SERPL-CCNC: 48 U/L (ref 33–110)
ALT SERPL W P-5'-P-CCNC: 8 U/L (ref 7–45)
ANION GAP SERPL CALC-SCNC: 13 MMOL/L (ref 10–20)
AST SERPL W P-5'-P-CCNC: 19 U/L (ref 9–39)
BASOPHILS # BLD AUTO: 0.04 X10*3/UL (ref 0–0.1)
BASOPHILS NFR BLD AUTO: 0.4 %
BILIRUB SERPL-MCNC: 0.6 MG/DL (ref 0–1.2)
BUN SERPL-MCNC: 18 MG/DL (ref 6–23)
CALCIUM SERPL-MCNC: 9 MG/DL (ref 8.6–10.3)
CHLORIDE SERPL-SCNC: 104 MMOL/L (ref 98–107)
CO2 SERPL-SCNC: 28 MMOL/L (ref 21–32)
CREAT SERPL-MCNC: 0.88 MG/DL (ref 0.5–1.05)
EGFRCR SERPLBLD CKD-EPI 2021: 83 ML/MIN/1.73M*2
EOSINOPHIL # BLD AUTO: 0.18 X10*3/UL (ref 0–0.7)
EOSINOPHIL NFR BLD AUTO: 2 %
ERYTHROCYTE [DISTWIDTH] IN BLOOD BY AUTOMATED COUNT: 12.2 % (ref 11.5–14.5)
GLUCOSE SERPL-MCNC: 93 MG/DL (ref 74–99)
HCT VFR BLD AUTO: 39.6 % (ref 36–46)
HGB BLD-MCNC: 13.1 G/DL (ref 12–16)
IMM GRANULOCYTES # BLD AUTO: 0.02 X10*3/UL (ref 0–0.7)
IMM GRANULOCYTES NFR BLD AUTO: 0.2 % (ref 0–0.9)
LYMPHOCYTES # BLD AUTO: 2.39 X10*3/UL (ref 1.2–4.8)
LYMPHOCYTES NFR BLD AUTO: 26.4 %
MCH RBC QN AUTO: 29.1 PG (ref 26–34)
MCHC RBC AUTO-ENTMCNC: 33.1 G/DL (ref 32–36)
MCV RBC AUTO: 88 FL (ref 80–100)
MONOCYTES # BLD AUTO: 0.45 X10*3/UL (ref 0.1–1)
MONOCYTES NFR BLD AUTO: 5 %
NEUTROPHILS # BLD AUTO: 5.96 X10*3/UL (ref 1.2–7.7)
NEUTROPHILS NFR BLD AUTO: 66 %
NRBC BLD-RTO: 0 /100 WBCS (ref 0–0)
PLATELET # BLD AUTO: 276 X10*3/UL (ref 150–450)
POTASSIUM SERPL-SCNC: 4.1 MMOL/L (ref 3.5–5.3)
PROT SERPL-MCNC: 6.8 G/DL (ref 6.4–8.2)
RBC # BLD AUTO: 4.5 X10*6/UL (ref 4–5.2)
SODIUM SERPL-SCNC: 141 MMOL/L (ref 136–145)
T4 FREE SERPL-MCNC: 1.11 NG/DL (ref 0.61–1.12)
TSH SERPL-ACNC: 10.55 MIU/L (ref 0.44–3.98)
WBC # BLD AUTO: 9 X10*3/UL (ref 4.4–11.3)

## 2024-08-15 PROCEDURE — 86363 MOG-IGG1 ANTB FLO CYTMTRY EA: CPT

## 2024-08-15 PROCEDURE — 84439 ASSAY OF FREE THYROXINE: CPT

## 2024-08-15 PROCEDURE — 85025 COMPLETE CBC W/AUTO DIFF WBC: CPT

## 2024-08-15 PROCEDURE — 86706 HEP B SURFACE ANTIBODY: CPT

## 2024-08-15 PROCEDURE — 99214 OFFICE O/P EST MOD 30 MIN: CPT | Performed by: FAMILY MEDICINE

## 2024-08-15 PROCEDURE — 86704 HEP B CORE ANTIBODY TOTAL: CPT

## 2024-08-15 PROCEDURE — 86803 HEPATITIS C AB TEST: CPT

## 2024-08-15 PROCEDURE — 84443 ASSAY THYROID STIM HORMONE: CPT

## 2024-08-15 PROCEDURE — 86256 FLUORESCENT ANTIBODY TITER: CPT

## 2024-08-15 PROCEDURE — 86481 TB AG RESPONSE T-CELL SUSP: CPT

## 2024-08-15 PROCEDURE — 1036F TOBACCO NON-USER: CPT | Performed by: FAMILY MEDICINE

## 2024-08-15 PROCEDURE — 86053 AQAPRN-4 ANTB FLO CYTMTRY EA: CPT

## 2024-08-15 PROCEDURE — 87340 HEPATITIS B SURFACE AG IA: CPT

## 2024-08-15 PROCEDURE — 72110 X-RAY EXAM L-2 SPINE 4/>VWS: CPT

## 2024-08-15 PROCEDURE — 82330 ASSAY OF CALCIUM: CPT

## 2024-08-15 PROCEDURE — 86787 VARICELLA-ZOSTER ANTIBODY: CPT

## 2024-08-15 PROCEDURE — 36415 COLL VENOUS BLD VENIPUNCTURE: CPT

## 2024-08-15 PROCEDURE — 80053 COMPREHEN METABOLIC PANEL: CPT

## 2024-08-15 PROCEDURE — 3008F BODY MASS INDEX DOCD: CPT | Performed by: FAMILY MEDICINE

## 2024-08-15 PROCEDURE — 3080F DIAST BP >= 90 MM HG: CPT | Performed by: FAMILY MEDICINE

## 2024-08-15 PROCEDURE — 3077F SYST BP >= 140 MM HG: CPT | Performed by: FAMILY MEDICINE

## 2024-08-15 RX ORDER — CYCLOBENZAPRINE HCL 10 MG
10 TABLET ORAL 2 TIMES DAILY PRN
Qty: 30 TABLET | Refills: 0 | Status: SHIPPED | OUTPATIENT
Start: 2024-08-15 | End: 2024-10-14

## 2024-08-15 ASSESSMENT — ENCOUNTER SYMPTOMS: BACK PAIN: 1

## 2024-08-15 NOTE — PATIENT INSTRUCTIONS
Myofascial back pain  No evidence of neurologic deficit, MS related symptoms      When you are back is acutely painful, it is important to avoid strenuous activity and heavy lifting.  However, you still wanted to get some range of motion and not let your back get too stiff.  You can take anti-inflammatory medications like ibuprofen or Aleve.  Tylenol also can be helpful for back pain.  Once your back pain is improving, do some trunk rotations and  back stretches daily.    If you continue to have back pain problems, follow up in the office or give us a call.  Especially if you have persisting symptoms such as numbness and tingling down the legs.

## 2024-08-15 NOTE — PROGRESS NOTES
Subjective   Patient ID: Marilou Singh is a 44 y.o. female who presents for Back Pain (Lower back pain for 2-3 days).    Last 3 days   Worse last night   No change in activity or fall     Has had some issues since March   Midline lower back     No urinary sxs   Tried essentail oils   No OTC meds       Has MS :  not on meds currently originally diagnosed in 2014  Recently saw neurology is trying to decide on medications  Does have a history of transverse myelitis      Back Pain         Review of Systems   Musculoskeletal:  Positive for back pain.       Objective   /90   Pulse 74   Ht 1.524 m (5')   Wt 81 kg (178 lb 9.6 oz)   SpO2 98%   BMI 34.88 kg/m²     Physical Exam  Constitutional:       Appearance: Normal appearance. She is well-developed.   Cardiovascular:      Rate and Rhythm: Normal rate and regular rhythm.      Heart sounds: Normal heart sounds. No murmur heard.  Pulmonary:      Effort: Pulmonary effort is normal.      Breath sounds: Normal breath sounds.   Musculoskeletal:      Comments: Gait within normal limits  No CVAT bilaterally  Tenderness to palpation along the right SI area greater than left  Bilateral lower extremities muscle strength 5 out of 5 and equal  Reflexes 2+ and equal   Neurological:      General: No focal deficit present.      Mental Status: She is alert and oriented to person, place, and time.      Motor: No weakness.      Gait: Gait normal.         Assessment/Plan   Assessment & Plan  Acute midline low back pain without sciatica    Orders:    cyclobenzaprine (Flexeril) 10 mg tablet; Take 1 tablet (10 mg) by mouth 2 times a day as needed for muscle spasms.    Multiple sclerosis (Multi)

## 2024-08-16 LAB
CA-I BLD-SCNC: 1.2 MMOL/L (ref 1.1–1.33)
HBV CORE AB SER QL: NONREACTIVE
HBV SURFACE AB SER-ACNC: 13.6 MIU/ML
HBV SURFACE AG SERPL QL IA: NONREACTIVE
HCV AB SER QL: NONREACTIVE
VARICELLA ZOSTER IGG INDEX: 2 IA
VZV IGG SER QL IA: POSITIVE

## 2024-08-17 LAB
NIL(NEG) CONTROL SPOT COUNT: NORMAL
PANEL A SPOT COUNT: 0
PANEL B SPOT COUNT: 0
POS CONTROL SPOT COUNT: NORMAL
T-SPOT. TB INTERPRETATION: NEGATIVE

## 2024-08-19 ENCOUNTER — EDUCATION (OUTPATIENT)
Dept: NEUROLOGY | Facility: HOSPITAL | Age: 44
End: 2024-08-19
Payer: COMMERCIAL

## 2024-08-19 ENCOUNTER — SPECIALTY PHARMACY (OUTPATIENT)
Dept: NEUROLOGY | Facility: HOSPITAL | Age: 44
End: 2024-08-19
Payer: COMMERCIAL

## 2024-08-19 ENCOUNTER — TELEPHONE (OUTPATIENT)
Dept: PRIMARY CARE | Facility: CLINIC | Age: 44
End: 2024-08-19
Payer: COMMERCIAL

## 2024-08-19 DIAGNOSIS — G35 MULTIPLE SCLEROSIS (MULTI): Primary | ICD-10-CM

## 2024-08-19 RX ORDER — OFATUMUMAB 20 MG/.4ML
20 INJECTION, SOLUTION SUBCUTANEOUS
Qty: 0.4 ML | Refills: 5 | Status: SHIPPED | OUTPATIENT
Start: 2024-08-19 | End: 2024-08-19 | Stop reason: SDUPTHER

## 2024-08-19 RX ORDER — OFATUMUMAB 20 MG/.4ML
20 INJECTION, SOLUTION SUBCUTANEOUS
Qty: 0.4 ML | Refills: 5 | Status: SHIPPED | OUTPATIENT
Start: 2024-08-19

## 2024-08-19 RX ORDER — OFATUMUMAB 20 MG/.4ML
20 INJECTION, SOLUTION SUBCUTANEOUS
Qty: 1.2 ML | Refills: 0 | Status: SHIPPED | OUTPATIENT
Start: 2024-08-19 | End: 2024-09-03

## 2024-08-19 RX ORDER — OFATUMUMAB 20 MG/.4ML
20 INJECTION, SOLUTION SUBCUTANEOUS
Qty: 1.2 ML | Refills: 0 | Status: SHIPPED | OUTPATIENT
Start: 2024-08-19 | End: 2024-08-19 | Stop reason: SDUPTHER

## 2024-08-19 NOTE — PROGRESS NOTES
Disease Modifying Therapy Initiation Pharmacist Consultation Visit    Patient was contacted via telephone at the request of Roverto Aguillon MD to discuss the initiation of a new disease modifying therapy for treatment of the patient's multiple sclerosis.    DMTs discussed: Kesimpta, Ocrevus, Vumerity, Zeposia    All pertinent counseling points of the medications listed above were discussed with the patient, including dosing, route of administration, warnings and precautions, side effects, and goals of therapy. Pregnancy and vaccine considerations were also discussed, if applicable. Financial assistance options for each therapy were introduced as well. All patient questions and concerns were addressed during this visit.     Patient has decided on Kesimpta therapy during their consultation visit. I explained the logistics of initiating the medication and had the patient sign the start form, if applicable. All appropriate baseline labs/tests have been reviewed and/or will be ordered and reviewed prior to dispensing of therapy.    DMT selected: Kesimpta    The insurance authorization process will be initiated and prescription/orders will be sent to the corresponding pharmacy pursuant to pharmacist-provider collaborative practice agreement. Patient was provided with my contact information and was asked to contact me directly with any follow up questions/concerns.    Donald Schulz, PharmD, BCPS, MSCS  Clinical Pharmacy Specialist- Neurology/MS  [facility]  Phone: [phone]  Fax: [phone]  08/19/24  2:13 PM    8/23/24 ADDENDUM: I spoke with the patient this afternoon and she informed me that she would like to wait until her follow up visit with Dr. Aguillon on 10/22/24 to start the Kesimpta injections. I told the patient that this was reasonable and that if/when the Kesimpta pens arrive that she should keep them refrigerated until she is ready to complete the first injection of  medication. I asked her to give me a call if she had any questions prior to her upcoming appointment with Dr. Aguillon.    Donald Schulz PharmD, BCPS, [facility]  Clinical Pharmacy Specialist- Neurology/[facility]  Phone: [phone]  Fax: [phone]  8/23/2024  3:30 PM    8/29/24 ADDENDUM: Patient called me and had some additional questions about Kesimpta. I asnwered these questions and provided some additional information on Vumerity as well. After counseling patient is now thinking she may want to initiate Vumerity. I informed her to continue to weigh her options and we would finalize her decision at her upcoming appointment with Dr. Aguillon. I will cancel Kesimpta prescription from [facility] for now.    Donald Schulz PharmD, BCPS, [facility]  Clinical Pharmacy Specialist- Neurology/[facility]  Phone: [phone]  Fax: [phone]  08/29/24  3:46 PM

## 2024-08-19 NOTE — TELEPHONE ENCOUNTER
Pt states she is anxious for a result from her xray done on Friday with Dr DUNAWAY.  They are still in processing with radiology, but the pt is wondering why this is taking so long?

## 2024-08-20 NOTE — TELEPHONE ENCOUNTER
LADAN notifying x-ray was read last night and she should expect Dr. Dominique interpretation/recommendation this afternoon, to call again if any questions.

## 2024-08-21 LAB — MOG FACS TITER,S: ABNORMAL TITER

## 2024-08-22 ENCOUNTER — APPOINTMENT (OUTPATIENT)
Dept: NEUROLOGY | Facility: CLINIC | Age: 44
End: 2024-08-22
Payer: COMMERCIAL

## 2024-08-22 LAB
AQP4 H2O CHANNEL IGG SERPL QL: NEGATIVE
IMMUNOLOGIST REVIEW: ABNORMAL
MOG IGG1 SERPL QL FC: REACTIVE

## 2024-08-23 LAB
JC VIRUS AB (SJC): POSITIVE
JCV INDEX VALUE (SJC): 1.38

## 2024-08-26 ENCOUNTER — APPOINTMENT (OUTPATIENT)
Dept: NEUROLOGY | Facility: HOSPITAL | Age: 44
End: 2024-08-26
Payer: COMMERCIAL

## 2024-10-22 ENCOUNTER — TELEMEDICINE (OUTPATIENT)
Dept: NEUROLOGY | Facility: HOSPITAL | Age: 44
End: 2024-10-22
Payer: COMMERCIAL

## 2024-10-22 DIAGNOSIS — G35 MULTIPLE SCLEROSIS (MULTI): Primary | ICD-10-CM

## 2024-10-22 PROCEDURE — 99214 OFFICE O/P EST MOD 30 MIN: CPT | Mod: 95 | Performed by: PSYCHIATRY & NEUROLOGY

## 2024-10-22 PROCEDURE — 99214 OFFICE O/P EST MOD 30 MIN: CPT | Performed by: PSYCHIATRY & NEUROLOGY

## 2024-10-22 RX ORDER — DIROXIMEL FUMARATE 231 MG/1
2 CAPSULE ORAL 2 TIMES DAILY
Qty: 120 CAPSULE | Refills: 5 | Status: SHIPPED | OUTPATIENT
Start: 2024-10-22 | End: 2025-10-22

## 2024-10-22 RX ORDER — DIROXIMEL FUMARATE 231 MG/1
CAPSULE ORAL
Qty: 120 CAPSULE | Refills: 0 | Status: SHIPPED | OUTPATIENT
Start: 2024-10-22

## 2024-10-22 NOTE — PROGRESS NOTES
Subjective     Marilou Singh is a 44 y.o. year old female with hx of HTN and hypothyroidism who is here for MS follow up (virtual visit)      History of Present Illness   [facility] NEURO IMMUNOLOGY HPI: Date of Onset: 2014  Date of Diagnosis: 2023  Last MRI Brain: 2/2024  Last MRI Cervical: 2023  Last MRI Thoracis: not done  Last OCT/VEP: not done  Disease Course at Onset: RR  Disease Course Now: RR  Current Disease Modifying Therapies: None  Previous Disease Modifying Therapies: None  Cerebrospinal Fluid: not done   Marcus Puga Virus: positive 8/2024, index 1.3  Varicella Zoster Virus: positive 8/2024  Hep Panel: negative 8/2024  Neuromyelitis Optica: negative 8/2024  Myelin Oligodendrocyte Glycoprotein: positive low titer 1:20, 8/2024, likely false positive    HPI  Interval hx:  Tested positive for low titer MOG antibody (1:20). Review her MRIs show mostly well-defined demyelinating lesions typical of MS in the brain, brainstem, and spinal cord. She may have an H-shaped lesion in her axial cervical images but overall, she does not meet criteria for MOGAD in my opinion and she most likely has RRMS with false positive MOG antibody. After a long discussion, we agreed to start vumerity.      MS Symptom Review: Weakness: No Weakness  Sensory Changes: yes presenting symptom in 2014, legs and arms, ascending numbness, also facial numbness in 2023   Incoordination: No incoordination   Falling: No falling  Gait Change: No gait change  Painful Vision Loss: No painful vision loss  Double Vision: in 2022, double vision upon looking to the left, lasted for several months then resolved on its own.   Vertigo: positional vertigo since 2014  Facial/Bulvar Weakness: No facial/bulvar weakness  Bladder/Bowel Dysfunction: stress incontinence only   Spasticity: No spasticity  Tonic Spasms: No tonic spasms  Tremors: gets shaky when hungry  Restless Leg Syndrome: No restless leg syndrome   Dystonia: No dystonia  Other Movement  Disorders: No other movement disorders  Heat Sensitivity: No heat sensitivity  Fatigue: yes but also hypothyroid  Depression: No depression   Anxiety: No anxiety  Cognitive Changes: No cognitive changes  Disease Modifying Therapies: No Disease modifying therapies   Side Effects: No side effects  Is the patient taking Vitamin D supplements? 2000 units a day  Is the patient taking Symptomatic medications? none       Past Surgical History:   Procedure Laterality Date    OTHER SURGICAL HISTORY  09/22/2021    Fallopian tube surgical procedure    OTHER SURGICAL HISTORY  09/22/2021    Appendectomy    THYROID SURGERY  12/27/2016    Thyroid Surgery     Social History     Tobacco Use    Smoking status: Never    Smokeless tobacco: Never   Substance Use Topics    Alcohol use: Never     Allergies   Allergen Reactions    Theraflu Flu And Cold Swelling     Visit Vitals  OB Status Having periods   Smoking Status Never       Objective   Virtual visit. Normal mental state and no involuntary movements.     CLINICAL SCORES  Scales and Scores:     EDSS: 0    Assessment/Plan     This is a 44 y.o. 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    The Neurological Exam showed no abnormality.   The MRI Showed a low burden of non-enhancing typical demyelinating plaques in periventricular, juxtacortical, pontine, and cervical locations on the MRI from 2/2024. Brain MRI in 2023 showed the right pontine lesion to be enhancing and new compared to MRI from 2021. MRI cervical shows typical short segment demyelinating lesions but with questionable H-shaped lesion on axial cuts. I personally reviewed all her MRIs.   Cerebrospinal Fluid not done and not needed  OCT/VEP not done  MS Mimics testing was positive for low titer MOG (1:20) that is likely false positive based on overall  assessment of clinical and radiological data.   The overall picture is suggestive of RRMS with activity.   Disease Modifying Therapies: she needs to start a potent DMT given involvement of the brainstem and the spinal cord. She will likely be a good candidate for vumerity, zeposia, kesimpta, or ocrevus.  She is very concerned about treatment-associated risks and cost, which makes vumerity is the most suitable option for her overall. After a long discussion, we mutually agreed to start vumerity.     PLAN:  - Discussion: The diagnosis of MS was discussed with the patient and family in detail. All questions answered and reading material provided.  - Acute relapse treatment: Not indicated  - DMT: will start vumerity. Mechanism of action, efficacy, side effects, risks, and alternatives associated with the medication were discussed and information material was provided. All questions answered.   - Symptomatic: not indicated  - MRI: Will need periodic monitoring after DMT initiation. She requested that I order her next MRI in December but is best done around April of 2025 as rebaseline after vumerity.    - Blood work: Will monitor CBC diff and CMP periodically while on vumerity.   - Vitamin D: increase dose to 4000 units daily.   - PT/OT: not indicated   - Smoking: never a smoker  - Wellness: discussed low salt and healthy dieting.   - Follow up: after three months to ensure compliance on vumerity given her initial DMT hesitancy.     Roverto Aguillon MD           No orders of the defined types were placed in this encounter.

## 2024-10-28 ENCOUNTER — APPOINTMENT (OUTPATIENT)
Dept: PRIMARY CARE | Facility: CLINIC | Age: 44
End: 2024-10-28
Payer: COMMERCIAL

## 2024-12-20 ENCOUNTER — LAB (OUTPATIENT)
Dept: LAB | Facility: LAB | Age: 44
End: 2024-12-20
Payer: COMMERCIAL

## 2024-12-20 DIAGNOSIS — C73 THYROID CANCER (MULTI): ICD-10-CM

## 2024-12-20 LAB — TSH SERPL-ACNC: 3.44 MIU/L (ref 0.44–3.98)

## 2024-12-20 PROCEDURE — 84443 ASSAY THYROID STIM HORMONE: CPT

## 2024-12-20 PROCEDURE — 36415 COLL VENOUS BLD VENIPUNCTURE: CPT

## 2025-02-12 ENCOUNTER — HOSPITAL ENCOUNTER (OUTPATIENT)
Dept: RADIOLOGY | Facility: HOSPITAL | Age: 45
Discharge: HOME | End: 2025-02-12
Payer: COMMERCIAL

## 2025-02-12 VITALS — BODY MASS INDEX: 34.95 KG/M2 | HEIGHT: 60 IN | WEIGHT: 178 LBS

## 2025-02-12 DIAGNOSIS — Z12.31 BREAST CANCER SCREENING BY MAMMOGRAM: ICD-10-CM

## 2025-02-12 PROCEDURE — 77063 BREAST TOMOSYNTHESIS BI: CPT

## 2025-02-12 PROCEDURE — 77063 BREAST TOMOSYNTHESIS BI: CPT | Performed by: RADIOLOGY

## 2025-02-12 PROCEDURE — 77067 SCR MAMMO BI INCL CAD: CPT | Performed by: RADIOLOGY

## 2025-03-22 ENCOUNTER — OFFICE VISIT (OUTPATIENT)
Dept: URGENT CARE | Age: 45
End: 2025-03-22
Payer: COMMERCIAL

## 2025-03-22 VITALS
WEIGHT: 176 LBS | DIASTOLIC BLOOD PRESSURE: 82 MMHG | SYSTOLIC BLOOD PRESSURE: 126 MMHG | HEART RATE: 85 BPM | OXYGEN SATURATION: 98 % | TEMPERATURE: 97.9 F | RESPIRATION RATE: 17 BRPM | BODY MASS INDEX: 34.37 KG/M2

## 2025-03-22 DIAGNOSIS — J30.9 ALLERGIC RHINITIS, UNSPECIFIED SEASONALITY, UNSPECIFIED TRIGGER: Primary | ICD-10-CM

## 2025-03-22 RX ORDER — AZELASTINE 1 MG/ML
1 SPRAY, METERED NASAL 2 TIMES DAILY
Qty: 30 ML | Refills: 0 | Status: SHIPPED | OUTPATIENT
Start: 2025-03-22 | End: 2026-03-22

## 2025-03-22 RX ORDER — CETIRIZINE HYDROCHLORIDE 10 MG/1
10 TABLET ORAL DAILY
Qty: 30 TABLET | Refills: 0 | Status: SHIPPED | OUTPATIENT
Start: 2025-03-22 | End: 2025-04-21

## 2025-03-22 ASSESSMENT — ENCOUNTER SYMPTOMS
SORE THROAT: 1
RHINORRHEA: 1

## 2025-03-22 NOTE — PROGRESS NOTES
Subjective   Patient ID: Marilou Singh is a 45 y.o. female. They present today with a chief complaint of Earache (Pt states ear pain, sinus pressure x 2-3 months).    History of Present Illness  Patient present today with a chief complaint of earache. Reports ear pain, sinus pressure x 2-3 months. Denies fever.     Past Medical History  Allergies as of 03/22/2025 - Reviewed 03/22/2025   Allergen Reaction Noted    Theraflu flu and cold Swelling 10/15/2018       (Not in a hospital admission)       Past Medical History:   Diagnosis Date    Abnormal MRI 02/27/2023    Cough 02/27/2023    Disorder of brain, unspecified 09/20/2021    Brain lesion    Injury of toe 07/02/2024    Spondylosis without myelopathy or radiculopathy, cervical region     DJD (degenerative joint disease), cervical       Past Surgical History:   Procedure Laterality Date    OTHER SURGICAL HISTORY  09/22/2021    Fallopian tube surgical procedure    OTHER SURGICAL HISTORY  09/22/2021    Appendectomy    THYROID SURGERY  12/27/2016    Thyroid Surgery        reports that she has never smoked. She has never used smokeless tobacco. She reports that she does not drink alcohol and does not use drugs.    Review of Systems  Review of Systems   HENT:  Positive for congestion, ear pain, rhinorrhea and sore throat.    All other systems reviewed and are negative.                                 Objective    Vitals:    03/22/25 1407   BP: 126/82   BP Location: Left arm   Patient Position: Sitting   BP Cuff Size: Adult   Pulse: 85   Resp: 17   Temp: 36.6 °C (97.9 °F)   TempSrc: Skin   SpO2: 98%   Weight: 79.8 kg (176 lb)     No LMP recorded (lmp unknown).    Physical Exam  Vitals reviewed.   General: Vitals Noted. No distress. Normocephalic.  Cardiovascular:     Heart sounds: Normal heart sounds, S1 normal and S2 normal. No murmur heard.     No friction rub.   Pulmonary:      Effort: Pulmonary effort is normal.      Breath sounds:  Lungs clear to auscultation  bilaterally   HEENT: Left and right TM is within normal limits, bulging without exudative air-fluid levels.  Negative preauricular tenderness.  No drainage.  EOMI, normal conjunctiva, patent nares, Normal OP No maxillary and frontal sinus tenderness on palpation is present. Pharynx and tonsils are hyperemic, and without exudate.   Neck: Supple with no adenopathy.  Lymphadenopathy:   No cervical adenopathy on palpation  Lower Body: No right inguinal adenopathy. No left inguinal adenopathy.   Abdominal:      Palpations: There is no hepatomegaly, splenomegaly or mass. Abdomen is soft, non-tender, and non-distended. No suprapubic tenderness. No CVA tenderness.   Skin:     Comments: no rash   Neurological:      Cranial Nerves: Cranial nerves 2-12 are intact.      Sensory: No sensory deficit.      Motor: Motor function is intact.      Deep Tendon Reflexes: Reflexes are normal and symmetric.       Procedures    Point of Care Test & Imaging Results from this visit  No results found for this visit on 03/22/25.   No results found.    Diagnostic study results (if any) were reviewed by CYNTHIA Ramsey.    Assessment/Plan   Allergies, medications, history, and pertinent labs/EKGs/Imaging reviewed by CYNTHIA Ramsey.     Medical Decision Making  On exam patient is non toxic, in no distress. On examination as above, no concerns for bronchitis, PNA given short duration of symptoms and examination. Lungs are clean on auscultation, no wheezing or rales. No concerns for sinus infection given no maxillary or frontal tenderness on palpation. Bilateral TM are WNL, bulging without exudative air-fluid levels, clear postnasal drip noted. Concern for seasonal allergy in the setting of ongoing symptoms for several months. Will treat with Zyrtec PO and Azelastine nasal spray for nasal congestion. Patient has Flonase at home, will alternate. Will be discharged with instructions to increase fluid intake, use of OTC for symptoms  relief. Advised to follow up with PCP for further management. Advised to return if symptoms worsen and needs to be reevaluated. Reviewed with patient signs and symptoms significant to present to ED. Patient verbalized understanding and agreed with the plan.        Orders and Diagnoses  There are no diagnoses linked to this encounter.    Medical Admin Record      Patient disposition: Home    Electronically signed by CYNTHIA Ramsey  3:03 PM

## 2025-05-12 ENCOUNTER — OFFICE VISIT (OUTPATIENT)
Dept: NEUROLOGY | Facility: HOSPITAL | Age: 45
End: 2025-05-12
Payer: COMMERCIAL

## 2025-05-12 VITALS
DIASTOLIC BLOOD PRESSURE: 90 MMHG | TEMPERATURE: 97.1 F | RESPIRATION RATE: 18 BRPM | SYSTOLIC BLOOD PRESSURE: 144 MMHG | HEART RATE: 99 BPM | HEIGHT: 60 IN | BODY MASS INDEX: 35.14 KG/M2 | WEIGHT: 179 LBS

## 2025-05-12 DIAGNOSIS — G35 MULTIPLE SCLEROSIS (MULTI): Primary | ICD-10-CM

## 2025-05-12 PROCEDURE — 3077F SYST BP >= 140 MM HG: CPT | Performed by: PSYCHIATRY & NEUROLOGY

## 2025-05-12 PROCEDURE — 3080F DIAST BP >= 90 MM HG: CPT | Performed by: PSYCHIATRY & NEUROLOGY

## 2025-05-12 PROCEDURE — 3008F BODY MASS INDEX DOCD: CPT | Performed by: PSYCHIATRY & NEUROLOGY

## 2025-05-12 PROCEDURE — 99214 OFFICE O/P EST MOD 30 MIN: CPT | Performed by: PSYCHIATRY & NEUROLOGY

## 2025-05-12 PROCEDURE — 99214 OFFICE O/P EST MOD 30 MIN: CPT | Mod: GC | Performed by: PSYCHIATRY & NEUROLOGY

## 2025-05-12 PROCEDURE — 1036F TOBACCO NON-USER: CPT | Performed by: PSYCHIATRY & NEUROLOGY

## 2025-05-12 ASSESSMENT — VISUAL ACUITY: VA_NORMAL: 1

## 2025-05-12 ASSESSMENT — PAIN SCALES - GENERAL: PAINLEVEL_OUTOF10: 0-NO PAIN

## 2025-05-12 NOTE — PROGRESS NOTES
Subjective     Marilou Singh is a 45 y.o. year old female with hx of HTN and hypothyroidism who is here for a follow up visit for MS. She previously followed with Dr. Martita Mcclain.       History of Present Illness   [facility] NEURO IMMUNOLOGY HPI: Date of Onset: 2014  Date of Diagnosis: 2023  Last MRI Brain: 2/2024  Last MRI Cervical: 2023  Last MRI Thoracis: not done  Last OCT/VEP: not done  Disease Course at Onset: RR  Disease Course Now: RR  Current Disease Modifying Therapies: Vumerity since 11/2024  Previous Disease Modifying Therapies: None  Cerebrospinal Fluid: not done   Marcus Puga Virus: positive, 1.38 (8/2024)  Varicella Zoster Virus: positive (8/2024)  Hep Panel: negative (8/2024)  Neuromyelitis Optica: negative (8/2024)  Myelin Oligodendrocyte Glycoprotein: positive 1:20 (8/2024)    HPI  Symptoms started in 2014 with ascending numbness from the feet up the legs, trunk, and hands. She was in [place] at that time and no specific diagnosis was given after spinal and brain MRIs but she recalls getting prednisone tablets. Her symptoms improved after several months. She did well after and saw Dr. Rowland in 2021 for MRI monitoring (was asymptomatic at that time). MRI cervical and brain in 2021 showed a mild burden of non-enhancing typical demyelinating plaques in periventricular, juxtacortical, and cervical locations. She was diagnosed with MS and was referred to Dr. Martita Mcclain with who she agreed to monitor off DMT given disease stability of many years. In 2022, she developed double vision when looking to the left. She did not seek medical advice at that time and her symptoms resolved after a few months. In 2023 after a detoxification program, she developed numbness of the right side of the face and mouth. She had a brain MRI at that time that showed an enhancing lesion in the right alysia. She declined DMT at that time for fear of side effects and cost. Repeat brain MRI in February of 2024 showed stable  lesions.     Interval history    5/12/2025: She started Vumerity in 11/2024. Endorses missing 1-2 doses per week. Had new onset runny nose shortly after initiation of therapy. Last week had new onset of swelling/itching in her hands and feet. She has stopped Vumerity for the last 3 days with resolution of itching and minor improvement in her swelling. She reports no new MS clinical symptoms since her last visit. She hasn't had an MRI completed or labs collected since initiating Vumerity. Taking Vitamin D 4000 units daily.     MS Symptom Review: Weakness: No Weakness  Sensory Changes: yes presenting symptom in 2014, legs and arms, ascending numbness, also facial numbness in 2023   Incoordination: No incoordination   Falling: No falling  Gait Change: No gait change  Painful Vision Loss: No painful vision loss  Double Vision: in 2022, double vision upon looking to the left, lasted for several months then resolved on its own.   Vertigo: positional vertigo since 2014  Facial/Bulvar Weakness: No facial/bulvar weakness  Bladder/Bowel Dysfunction: stress incontinence only   Spasticity: No spasticity  Tonic Spasms: No tonic spasms  Tremors: gets shaky when hungry  Restless Leg Syndrome: No restless leg syndrome   Dystonia: No dystonia  Other Movement Disorders: No other movement disorders  Heat Sensitivity: No heat sensitivity  Fatigue: yes but also hypothyroid  Depression: No depression   Anxiety: No anxiety  Cognitive Changes: No cognitive changes  Disease Modifying Therapies: Vumerity  Side Effects: Runny nose; itching/swelling in hands/feet  Is the patient taking Vitamin D supplements? 4000 units a day  Is the patient taking Symptomatic medications? none       Past Surgical History:   Procedure Laterality Date    OTHER SURGICAL HISTORY  09/22/2021    Fallopian tube surgical procedure    OTHER SURGICAL HISTORY  09/22/2021    Appendectomy    THYROID SURGERY  12/27/2016    Thyroid Surgery     Social History     Tobacco Use     Smoking status: Never    Smokeless tobacco: Never   Substance Use Topics    Alcohol use: Never     Allergies   Allergen Reactions    Theraflu Flu And Cold Swelling     Visit Vitals  /90   Pulse 99   Temp 36.2 °C (97.1 °F)   Resp 18   Ht 1.524 m (5')   Wt 81.2 kg (179 lb)   BMI 34.96 kg/m²   OB Status Having periods   Smoking Status Never   BSA 1.85 m²       Objective   Neurological Exam  Mental Status  Awake, alert and oriented to person, place and time. Recent and remote memory are intact. Speech is normal. Language is fluent with no aphasia. Attention and concentration are normal.    Cranial Nerves  CN II: Visual acuity is normal. Visual fields full to confrontation.  CN III, IV, VI: Extraocular movements intact bilaterally. Normal lids and orbits bilaterally.  Relative afferent pupillary defect absent.  CN V: Facial sensation is normal.  CN VII: Full and symmetric facial movement.  CN VIII: Hearing is normal.  CN IX, X: Palate elevates symmetrically  CN XI: Shoulder shrug strength is normal.  CN XII: Tongue midline without atrophy or fasciculations.    Motor  Normal muscle bulk throughout. No fasciculations present. Normal muscle tone. No abnormal involuntary movements. No pronator drift.    Sensory  Sensation is intact to light touch, pinprick, vibration and proprioception in all four extremities.    Reflexes                                            Right                      Left  Brachioradialis                    2+                         2+  Biceps                                 2+                         2+  Triceps                                2+                         2+  Patellar                                3+                         3+  Achilles                                2+                         2+    Right pathological reflexes: Ankle clonus absent.  Left pathological reflexes: Ankle clonus absent.    Coordination  Right: Finger-to-nose normal. Rapid alternating movement  normal.Left: Finger-to-nose normal. Rapid alternating movement normal.    Gait  Normal casual, toe, heel and tandem gait.    Physical Exam  Eyes:      General: Lids are normal.      Extraocular Movements: Extraocular movements intact.   Neurological:      Deep Tendon Reflexes:      Reflex Scores:       Tricep reflexes are 2+ on the right side and 2+ on the left side.       Bicep reflexes are 2+ on the right side and 2+ on the left side.       Brachioradialis reflexes are 2+ on the right side and 2+ on the left side.       Patellar reflexes are 3+ on the right side and 3+ on the left side.       Achilles reflexes are 2+ on the right side and 2+ on the left side.  Psychiatric:         Speech: Speech normal.         CLINICAL SCORES  Scales and Scores:     EDSS: 0    Assessment/Plan     This is a 45 y.o. 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    The Neurological Exam showed no abnormality.   The MRI Showed a low burden of non-enhancing typical demyelinating plaques in periventricular, juxtacortical, pontine, and cervical locations on the MRI from 2/2024. Brain MRI in 2023 showed the right pontine lesion to be enhancing and new compared to MRI from 2021. I personally reviewed all her MRIs.   Cerebrospinal Fluid not done and not needed  OCT/VEP not done  MS Mimics ruled out  The overall picture is suggestive of RRMS with activity.   Disease Modifying Therapies: she needs to start a potent DMT given involvement of the brainstem and the spinal cord. She will likely be a good candidate for vumerity, zeposia, kesimpta, or ocrevus.  She is very concerned about treatment-associated risks and cost, which makes vumerity is the most suitable option for her overall. She started Vumerity in November 2024.    5/12/2025: She started Vumerity in 11/2024. She  reports missing 1-2 doses per week. Had new onset runny nose shortly after initiation of therapy. Last week had new onset of swelling/itching in her hands and feet. She has stopped Vumerity for the last 3 days with resolution of itching and minor improvement in her swelling. These are not typical side effects of Vumerity and timing of onset of itching/swelling would be unusual so we will plan to reintroduce vumerity again after one week of holding to test causality. She reports no new MS clinical symptoms since her last visit. She hasn't had an MRI completed or labs collected since initiating Vumerity. She is due for both MRI and labs today. Taking Vitamin D 4000 units daily.     PLAN:  - Discussion: The diagnosis of MS was discussed with the patient and family in detail. All questions answered and reading material provided.  - Acute relapse treatment: Not indicated  - DMT: Vumerity. Hold Vumerity for 1 week to assess whether swelling improves further. Will plan to reintroduce Vumerity at half dose after one week, then increase to full dose Vumerity after 1 week. If side effects return will plan to discontinue Vumerity and discuss alternatives.  - Symptomatic: not indicated  - MRI: She is due for her 6 month MRI now. Its ordered and she will call to schedule it.   - Blood work: Will order CBC/diff and CMP for Vumerity monitoring to be done today.  - Vitamin D: continue 4000 units daily.   - PT/OT: not indicated   - Smoking: never a smoker  - Wellness: discussed low salt and healthy dieting.   - Follow up: in 3 months (August 2025) to discuss MRI and lab results.     Donald Schulz, PharmD, BCPS, MSCS  Clinical Pharmacy Specialist- Neurology/MS  [facility]  Phone: [phone]  Fax: [phone]  05/12/25  12:10 PM

## 2025-05-12 NOTE — PATIENT INSTRUCTIONS
I am sorry to hear that you have had some side effects with Vumerity. Please continue to hold it until Saturday, then plan to restart Vumerity at a dose of 1 capsule twice daily for 1 week, then increase to 2 capsules by mouth twice daily after that.    Please continue to take Vitamin D at a dose of 4000 units daily.    You are due for your next bloodwork today. Please stop off at the lab on your way out today if you can.    Your next MRI is due now. Please call [phone] to schedule this.    Please follow up with us again in August 2025.    It was a pleasure to meet you today!    Donald Schulz, PharmD, BCPS, MSCS  Clinical Pharmacy Specialist- Neurology/MS  [facility]  Phone: [phone]  Fax: [phone]  05/12/25  11:44 AM

## 2025-05-13 LAB
ALBUMIN SERPL-MCNC: 4.2 G/DL (ref 3.6–5.1)
ALP SERPL-CCNC: 39 U/L (ref 31–125)
ALT SERPL-CCNC: 8 U/L (ref 6–29)
ANION GAP SERPL CALCULATED.4IONS-SCNC: 12 MMOL/L (CALC) (ref 7–17)
AST SERPL-CCNC: 16 U/L (ref 10–35)
BASOPHILS # BLD AUTO: 101 CELLS/UL (ref 0–200)
BASOPHILS NFR BLD AUTO: 1.5 %
BILIRUB SERPL-MCNC: 0.5 MG/DL (ref 0.2–1.2)
BUN SERPL-MCNC: 14 MG/DL (ref 7–25)
CALCIUM SERPL-MCNC: 8.7 MG/DL (ref 8.6–10.2)
CHLORIDE SERPL-SCNC: 104 MMOL/L (ref 98–110)
CO2 SERPL-SCNC: 24 MMOL/L (ref 20–32)
CREAT SERPL-MCNC: 0.72 MG/DL (ref 0.5–0.99)
EGFRCR SERPLBLD CKD-EPI 2021: 105 ML/MIN/1.73M2
EOSINOPHIL # BLD AUTO: 221 CELLS/UL (ref 15–500)
EOSINOPHIL NFR BLD AUTO: 3.3 %
ERYTHROCYTE [DISTWIDTH] IN BLOOD BY AUTOMATED COUNT: 12.9 % (ref 11–15)
GLUCOSE SERPL-MCNC: 96 MG/DL (ref 65–99)
HCT VFR BLD AUTO: 37.4 % (ref 35–45)
HGB BLD-MCNC: 12.1 G/DL (ref 11.7–15.5)
LYMPHOCYTES # BLD AUTO: 1387 CELLS/UL (ref 850–3900)
LYMPHOCYTES NFR BLD AUTO: 20.7 %
MCH RBC QN AUTO: 28.7 PG (ref 27–33)
MCHC RBC AUTO-ENTMCNC: 32.4 G/DL (ref 32–36)
MCV RBC AUTO: 88.8 FL (ref 80–100)
MONOCYTES # BLD AUTO: 476 CELLS/UL (ref 200–950)
MONOCYTES NFR BLD AUTO: 7.1 %
NEUTROPHILS # BLD AUTO: 4516 CELLS/UL (ref 1500–7800)
NEUTROPHILS NFR BLD AUTO: 67.4 %
PLATELET # BLD AUTO: 350 THOUSAND/UL (ref 140–400)
PMV BLD REES-ECKER: 10.2 FL (ref 7.5–12.5)
POTASSIUM SERPL-SCNC: 4.3 MMOL/L (ref 3.5–5.3)
PROT SERPL-MCNC: 6.8 G/DL (ref 6.1–8.1)
RBC # BLD AUTO: 4.21 MILLION/UL (ref 3.8–5.1)
SODIUM SERPL-SCNC: 140 MMOL/L (ref 135–146)
WBC # BLD AUTO: 6.7 THOUSAND/UL (ref 3.8–10.8)

## 2025-06-02 ENCOUNTER — APPOINTMENT (OUTPATIENT)
Dept: PRIMARY CARE | Facility: CLINIC | Age: 45
End: 2025-06-02
Payer: COMMERCIAL

## 2025-06-02 VITALS
HEART RATE: 80 BPM | OXYGEN SATURATION: 95 % | TEMPERATURE: 98.1 F | WEIGHT: 178.13 LBS | DIASTOLIC BLOOD PRESSURE: 72 MMHG | BODY MASS INDEX: 34.79 KG/M2 | SYSTOLIC BLOOD PRESSURE: 125 MMHG

## 2025-06-02 DIAGNOSIS — G37.3 TRANSVERSE MYELITIS (MULTI): ICD-10-CM

## 2025-06-02 DIAGNOSIS — I10 PRIMARY HYPERTENSION: ICD-10-CM

## 2025-06-02 DIAGNOSIS — M54.50 CHRONIC LEFT-SIDED LOW BACK PAIN WITHOUT SCIATICA: Primary | ICD-10-CM

## 2025-06-02 DIAGNOSIS — R31.29 HEMATURIA, MICROSCOPIC: ICD-10-CM

## 2025-06-02 DIAGNOSIS — M47.817 FACET ARTHROPATHY, LUMBOSACRAL: ICD-10-CM

## 2025-06-02 DIAGNOSIS — C73 THYROID CANCER (MULTI): ICD-10-CM

## 2025-06-02 DIAGNOSIS — G89.29 CHRONIC LEFT-SIDED LOW BACK PAIN WITHOUT SCIATICA: Primary | ICD-10-CM

## 2025-06-02 LAB
POC APPEARANCE, URINE: CLEAR
POC BILIRUBIN, URINE: NEGATIVE
POC BLOOD, URINE: ABNORMAL
POC COLOR, URINE: YELLOW
POC GLUCOSE, URINE: NEGATIVE MG/DL
POC KETONES, URINE: NEGATIVE MG/DL
POC LEUKOCYTES, URINE: ABNORMAL
POC NITRITE,URINE: NEGATIVE
POC PH, URINE: 6.5 PH
POC PROTEIN, URINE: NEGATIVE MG/DL
POC SPECIFIC GRAVITY, URINE: 1.02
POC UROBILINOGEN, URINE: 0.2 EU/DL
PREGNANCY TEST URINE, POC: NEGATIVE

## 2025-06-02 PROCEDURE — 99214 OFFICE O/P EST MOD 30 MIN: CPT | Performed by: FAMILY MEDICINE

## 2025-06-02 PROCEDURE — 81003 URINALYSIS AUTO W/O SCOPE: CPT | Performed by: FAMILY MEDICINE

## 2025-06-02 PROCEDURE — 81025 URINE PREGNANCY TEST: CPT | Performed by: FAMILY MEDICINE

## 2025-06-02 PROCEDURE — 3078F DIAST BP <80 MM HG: CPT | Performed by: FAMILY MEDICINE

## 2025-06-02 PROCEDURE — 3074F SYST BP LT 130 MM HG: CPT | Performed by: FAMILY MEDICINE

## 2025-06-02 RX ORDER — LISINOPRIL 20 MG/1
20 TABLET ORAL DAILY
Qty: 100 TABLET | Refills: 1 | Status: SHIPPED | OUTPATIENT
Start: 2025-06-02

## 2025-06-02 ASSESSMENT — ENCOUNTER SYMPTOMS
ABDOMINAL PAIN: 0
CHILLS: 0
RECTAL PAIN: 0
ABDOMINAL DISTENTION: 0
ANAL BLEEDING: 0
CONSTIPATION: 0
BLOOD IN STOOL: 0
NUMBNESS: 0
DIARRHEA: 1
DIAPHORESIS: 0
WEAKNESS: 0
FEVER: 0
BACK PAIN: 1
NAUSEA: 0
VOMITING: 0

## 2025-06-02 NOTE — ASSESSMENT & PLAN NOTE
Likely related to facet arthropathy v. lower lumbar muscle strain. PT, recheck. NSAID PRN. DDx: lumbar radiculopathy, UTI, adnexal cyst or other gynecologic, urolithiasis, hip arthritis, piriformis syndrome, referred pain (kidney, quadratus lumborum  Orders:    Referral to Physical Therapy; Future    Follow Up In Primary Care - Established; Future    POCT UA Automated manually resulted    POCT pregnancy, urine manually resulted    Urine Culture

## 2025-06-02 NOTE — ASSESSMENT & PLAN NOTE
Well controlled.   Orders:    lisinopril 20 mg tablet; Take 1 tablet (20 mg) by mouth once daily.

## 2025-06-02 NOTE — PATIENT INSTRUCTIONS
"Problems that are unlikely, but that would warrant calling 911 or going to the nearest emergency room, are weakness in both arms or both legs, numbness between the legs (in the \"saddle\" area), loss of bowel or bladder control, or a progressively worse symptom (especially weakness, numbness, tingling, pain, nerve pain). Please return earlier or seek immediate medical attention if any question or concern.     Please return or seek medical attention if symptoms persist, change, worsen, or return. For emergencies, call 9-1-1 or go to the nearest Emergency Room. Please return for a follow-up appointment after 6 weeks of physical therapy. Please schedule additional problem-focused appointment(s) to address additional problem(s). Simply mentioning or talking briefly about a problem or concern does not necessarily mean it is currently being addressed. Time constraints dictate that not every problem/concern can always be addressed.    Avoid taking Biotin (a vitamin, shows up particularly in hair/nail supplements) for a week prior to any blood tests, as it can interfere with certain results. Fasting for labs means 12 hours, nothing to eat or drink, except water and medications, unless directed otherwise.    For assistance with scheduling referrals or consultations, please call [phone]. For laboratory, radiology, and other tests, please call [phone] ([phone] for pediatrics). Please review prescription inserts and published information for possible adverse effects of all medications. Return after testing or consultation to review results and recommendations, if symptoms persist, change, worsen, or return, or if you have any question or concern. If you do not get results within 7-10 days, or you have any question or concern, please send a message, call the office ([phone]), or return to the office for a follow-up appointment. For non-emergencies, you may call the office. For emergencies, call 9-1-1 or go to " the nearest Emergency Department. Please schedule additional appointment(s) to address concern(s) not addressed today. An annual Wellness visit is strongly recommended. A Wellness visit should be dedicated to addressing routine health maintenance matters (e.g., cancer screenings, cardiovascular screening, etc.). Problem-focused visits, typically prompted by symptoms or specific concerns, are usually conducted separately, particularly if multiple or complex problems need to be addressed.    In general, results are not released or discussed over the telephone, but at an appointment or via  [employer]. Results of tests done through [facility] are released via  [employer] (see below).  [email or web address]   [email or web address] support line: [phone]

## 2025-06-02 NOTE — PROGRESS NOTES
"Subjective   Patient ID: Marilou Singh is a 45 y.o. female h/o degenerative joint disease, who presents for Hypertension (Pt presents for BP check, concerned of left lower flank/hip pain, states sharp pains sometimes. Not sure of refills.).  HPI Historian(s): Self    Back pain resolved. Concerned about arthropathy in her spine showing up on X-ray. No other joints have bothered her. h/o normal ESR, CRP, negative RF, BEAR. Denies FMHx autoimmune or rheumatologic disorder. Now c/o left lower back or hip pain, notices it most often when traversing stairs, but not all the time. Is on her feet all day at work. Started perhaps January 2025. Overall neither worsening or improving. Is intermittent. Denies fever, chills, sweats, urinary c/o    Denies weakness, numbness, tingling, weakness in both legs, numbness between the legs (in the \"saddle\" area), loss of bowel or bladder control, or a progressively worse neurological symptom (weakness, numbness, tingling, pain, nerve pain).     Single episode of diarrhea a few days ago, no other episodes, and she attributes it to something she ate, otherwise no bowel c/o.    Review of Systems   Constitutional:  Negative for chills, diaphoresis and fever.   Gastrointestinal:  Positive for diarrhea. Negative for abdominal distention, abdominal pain, anal bleeding, blood in stool, constipation, nausea, rectal pain and vomiting.   Genitourinary: Negative.  Negative for enuresis.   Musculoskeletal:  Positive for back pain.   Neurological:  Negative for weakness and numbness.   All other systems reviewed and are negative.    Patient's last menstrual period was 05/12/2025.    Tobacco Use History[1]  Social History     Substance and Sexual Activity   Alcohol Use Never     Social History     Substance and Sexual Activity   Drug Use Never       Family History[2]    Patient Care Team:  Beni Campos DO as PCP - General (Family Medicine)  Luiza Zarco MD as Consulting Physician " (Endocrinology)  Jeremy Townsend MD as Referring Physician (Obstetrics and Gynecology)  Roverto Aguillon MD as On Call Attending Physician (Neurology)    RX Allergies[3]    Prior to Admission medications    Medication Sig Start Date End Date Taking? Authorizing Provider   cholecalciferol (Vitamin D-3) 50 MCG (2000 UT) tablet Vitamin D 50 MCG (2000 UT) Oral Tablet  Refills: MD Kiah Montilla Start: 29-Jun-2023 6/29/23  Yes Historical Provider, MD   cyanocobalamin, vitamin B-12, (Vitamin B-12) 1,000 mcg tablet extended release TAKE 1 TABLET A DAY 12/30/21  Yes Historical Provider, MD   diroximel fumarate (Vumerity) 231 mg capsule,delayed release(DR/EC) Take 1 capsule by mouth twice daily for 7 days, then increase to take 2 capsules by mouth twice daily thereafter. 10/22/24  Yes Roverto Aguillon MD   diroximel fumarate (Vumerity) 231 mg capsule,delayed release(DR/EC) Take 2 capsules by mouth 2 times a day. 10/22/24 10/22/25 Yes Roverto Aguillon MD   levothyroxine (Synthroid, Levoxyl) 150 mcg tablet Take 1 tablet (150 mcg) by mouth once daily. as directed 8/7/24 8/7/25 Yes Luiza Zarco MD   lisinopril 20 mg tablet Take 1 tablet (20 mg) by mouth once daily. 7/8/24  Yes Beni Campos DO   azelastine (Astelin) 137 mcg (0.1 %) nasal spray Administer 1 spray into each nostril 2 times a day. Use in each nostril as directed  Patient not taking: Reported on 6/2/2025 3/22/25 3/22/26  CYNTHIA Ramsey   cetirizine (ZyrTEC) 10 mg tablet Take 1 tablet (10 mg) by mouth once daily.  Patient not taking: Reported on 6/2/2025 3/22/25 4/21/25  CYNTHIA Ramsey   cyclobenzaprine (Flexeril) 10 mg tablet Take 1 tablet (10 mg) by mouth 2 times a day as needed for muscle spasms.  Patient not taking: Reported on 6/2/2025 8/15/24 10/14/24  Julieta Kelley MD        Objective   /72   Pulse 80   Temp 36.7 °C (98.1 °F)   Wt 80.8 kg (178 lb 2 oz)   LMP 05/12/2025   SpO2 95%   BMI 34.79 kg/m²            Physical Exam  Vitals and nursing note reviewed.   Constitutional:       General: She is not in acute distress.     Appearance: Normal appearance.      Comments: No assistive device presently being used.   HENT:      Head: Normocephalic and atraumatic.   Eyes:      General: No scleral icterus.     Extraocular Movements: Extraocular movements intact.      Conjunctiva/sclera: Conjunctivae normal.   Pulmonary:      Effort: Pulmonary effort is normal. No respiratory distress.   Abdominal:      General: Bowel sounds are normal. There is no distension.      Palpations: Abdomen is soft. There is no hepatomegaly, splenomegaly or mass.      Tenderness: There is abdominal tenderness (mild LLQ). There is no right CVA tenderness, left CVA tenderness, guarding or rebound.   Musculoskeletal:         General: No tenderness (perhaps some tenderness along left SI joint, left lumbosacral junction, and left piriformis).   Skin:     General: Skin is warm and dry.      Coloration: Skin is not jaundiced.   Neurological:      Mental Status: She is alert and oriented to person, place, and time. Mental status is at baseline.      Cranial Nerves: No dysarthria.      Sensory: No sensory deficit.      Motor: No weakness, tremor, atrophy or abnormal muscle tone.      Gait: Gait normal.      Deep Tendon Reflexes: Reflexes normal.   Psychiatric:         Behavior: Behavior normal.         Assessment & Plan  Chronic left-sided low back pain without sciatica  Likely related to facet arthropathy v. lower lumbar muscle strain. PT, recheck. NSAID PRN. DDx: lumbar radiculopathy, UTI, adnexal cyst or other gynecologic, urolithiasis, hip arthritis, piriformis syndrome, referred pain (kidney, quadratus lumborum  Orders:    Referral to Physical Therapy; Future    Follow Up In Primary Care - Established; Future    POCT UA Automated manually resulted    POCT pregnancy, urine manually resulted    Urine Culture    Primary hypertension  Well  controlled.   Orders:    lisinopril 20 mg tablet; Take 1 tablet (20 mg) by mouth once daily.    Transverse myelitis (Multi)  Continue with neurology.       Thyroid cancer (Multi)  Continue with endocrinology.       Hematuria, microscopic    Orders:    Microscopic Only, Urine; Future    Facet arthropathy, lumbosacral  Likely not inflammatory. May consider further workup or referral if symptoms persist, change, worsen, or return.                           [1]   Social History  Tobacco Use   Smoking Status Never   Smokeless Tobacco Never   [2]   Family History  Problem Relation Name Age of Onset    Goiter Mother      Hypertension Mother      Hypertension Father      Other (Gynecologic cancer) Father's Sister      No Known Problems Maternal Grandmother      Kidney cancer Paternal Grandfather     [3]   Allergies  Allergen Reactions    Theraflu Flu And Cold Swelling

## 2025-06-02 NOTE — ASSESSMENT & PLAN NOTE
Likely not inflammatory. May consider further workup or referral if symptoms persist, change, worsen, or return.

## 2025-06-03 ENCOUNTER — APPOINTMENT (OUTPATIENT)
Dept: RADIOLOGY | Facility: HOSPITAL | Age: 45
End: 2025-06-03
Payer: COMMERCIAL

## 2025-06-03 DIAGNOSIS — G35 MULTIPLE SCLEROSIS (MULTI): ICD-10-CM

## 2025-06-03 LAB
BACTERIA #/AREA URNS HPF: NORMAL /HPF
HYALINE CASTS #/AREA URNS LPF: NORMAL /LPF
RBC #/AREA URNS HPF: NORMAL /HPF
SERVICE CMNT-IMP: NORMAL
SQUAMOUS #/AREA URNS HPF: NORMAL /HPF
WBC #/AREA URNS HPF: NORMAL /HPF

## 2025-06-03 PROCEDURE — 70553 MRI BRAIN STEM W/O & W/DYE: CPT

## 2025-06-03 PROCEDURE — 70553 MRI BRAIN STEM W/O & W/DYE: CPT | Performed by: RADIOLOGY

## 2025-06-03 PROCEDURE — A9575 INJ GADOTERATE MEGLUMI 0.1ML: HCPCS | Performed by: PSYCHIATRY & NEUROLOGY

## 2025-06-03 PROCEDURE — 2550000001 HC RX 255 CONTRASTS: Performed by: PSYCHIATRY & NEUROLOGY

## 2025-06-03 RX ORDER — GADOTERATE MEGLUMINE 376.9 MG/ML
0.2 INJECTION INTRAVENOUS
Status: COMPLETED | OUTPATIENT
Start: 2025-06-03 | End: 2025-06-03

## 2025-06-03 RX ADMIN — GADOTERATE MEGLUMINE 15 ML: 376.9 INJECTION INTRAVENOUS at 19:51

## 2025-06-04 ENCOUNTER — TELEPHONE (OUTPATIENT)
Dept: PRIMARY CARE | Facility: CLINIC | Age: 45
End: 2025-06-04
Payer: COMMERCIAL

## 2025-06-04 LAB — BACTERIA UR CULT: NORMAL

## 2025-06-04 NOTE — TELEPHONE ENCOUNTER
----- Message from Beni Campos sent at 6/4/2025  9:29 AM EDT -----  Please make sure patient is aware of the comments or MyChart message.      Urine culture shows bacteria that is considered non-pathogenic. An antibiotic is generally not indicated.  However, if you have any symptoms suggestive of a urinary tract infection, please come into or go to the office, urgent care, or ER for (re)evaluation.  Please return or seek immediate medical attention if symptoms persist, change, worsen, or return.  ----- Message -----  From: Ramonita Norwood MA  Sent: 6/2/2025   5:34 PM EDT  To: Beni Campos, DO

## 2025-06-09 ENCOUNTER — TELEPHONE (OUTPATIENT)
Dept: PRIMARY CARE | Facility: CLINIC | Age: 45
End: 2025-06-09
Payer: COMMERCIAL

## 2025-06-29 ENCOUNTER — OFFICE VISIT (OUTPATIENT)
Dept: URGENT CARE | Age: 45
End: 2025-06-29
Payer: COMMERCIAL

## 2025-06-29 VITALS
TEMPERATURE: 98.5 F | RESPIRATION RATE: 18 BRPM | BODY MASS INDEX: 35.15 KG/M2 | HEART RATE: 88 BPM | WEIGHT: 180 LBS | OXYGEN SATURATION: 98 % | DIASTOLIC BLOOD PRESSURE: 85 MMHG | SYSTOLIC BLOOD PRESSURE: 148 MMHG

## 2025-06-29 DIAGNOSIS — L98.9 SKIN LESION OF BACK: Primary | ICD-10-CM

## 2025-06-29 NOTE — PATIENT INSTRUCTIONS
VISIT SUMMARY:  During your visit, we discussed the blood spots on your skin that you recently noticed. These spots are located on your back and do not cause any pain or itching. You also mentioned other spots that appeared during your pregnancy, including one on your breast that has remained unchanged for eight years.    YOUR PLAN:  INSTRUCTIONS:  Please schedule a follow-up appointment with a dermatologist for further evaluation and potential removal. Additionally, consider scheduling an annual skin examination to monitor any changes in your skin.

## 2025-06-29 NOTE — PROGRESS NOTES
Subjective   Patient ID: Marilou Singh is a 45 y.o. female who presents for Rash (Pt concerned for black spots on her back, denies, itching, or pain noticed them yesterday).  History of Present Illness  Marilou Singh is a 45 year old female who presents with skin concern. She noticed a black dot on her skin, located on her back for the first time yesterday. The spot does not itch or cause pain but feels like something on the skin when touched.    Additionally, she mentions having other spots that appeared during her pregnancy, described as brown growths. One particular spot on her breast has been present for eight years and has remained unchanged in size and appearance. No itching or pain is associated with the blood spots on her back.      ROS is negative unless otherwise stated in HPI.       Objective     /85 (BP Location: Left arm, Patient Position: Sitting, BP Cuff Size: Large adult)   Pulse 88   Temp 36.9 °C (98.5 °F) (Oral)   Resp 18   Wt 81.6 kg (180 lb)   LMP 05/12/2025   SpO2 98%   BMI 35.15 kg/m²        VS: As documented in the triage note and EMR flowsheet from this visit was reviewed  General: Well appearing. No acute distress.   Eyes:  Extraocular movements grossly intact. No scleral icterus.   Head: Atraumatic. Normocephalic.     Neck: No meningismus. No gross masses. Full movement through range of motion  CV: Regular rhythm. No murmurs, rubs, gallops appreciated.   Resp: Clear to auscultation bilaterally. No respiratory distress.    GI: Nontender. Soft. No masses. No rebound, rigidity or guarding.   MSK: Symmetric muscle bulk. No gross step offs or deformities.  Skin: Warm, dry. No rashes.  4 mm raised, black lesion to the right lower back.  1 cm round heterogenous lesion just under the left breast.  Neuro: CN II-VII intact. A&O x3. Speech fluent. Alert. Moving all extremities. Ambulates with normal gait  Psych: Appropriate mood and affect for situation      Point of Care Test & Imaging  Results from this visit  No results found for this visit on 06/29/25.   Imaging  No results found.    Cardiology, Vascular, and Other Imaging  No other imaging results found for the past 2 days      Diagnostic study results (if any) were reviewed by Tracy Love PA-C.    Assessment/Plan   Allergies, medications, history, and pertinent labs/EKGs/Imaging reviewed by Tracy Love PA-C.     Assessment & Plan  Patient is a 45-year-old female who presents for skin concern.  She notes 2 lesions that she would like looked at, one she just noticed yesterday on her right lower back and another under the left breast that has been there for 8 years.  Skin findings are as noted in examination above.  Lesion on the back appears to be something like a blood blister that will likely resolve on its own.  The lesion under the left breast patient states has been there for 8 years and has remained unchanged.  Patient still expresses concern about having these evaluated for skin cancer.  Although I reassured the patient that these lesions do not appear cancerous in nature I did provide a referral for dermatology should she want the lesions removed or she would like annual skin examinations.  Patient is in agreement with this plan.    Orders and Diagnoses  Diagnoses and all orders for this visit:  Skin lesion of back  -     Referral to Dermatology        Disposition:  Home      Tracy Love PA-C     This medical note was created with the assistance of artificial intelligence (AI) for documentation purposes. The content has been reviewed and confirmed by the healthcare provider for accuracy and completeness. Patient consented to the use of audio recording and use of AI during their visit.

## 2025-06-30 ENCOUNTER — TELEPHONE (OUTPATIENT)
Dept: URGENT CARE | Age: 45
End: 2025-06-30

## 2025-07-07 ENCOUNTER — EVALUATION (OUTPATIENT)
Dept: PHYSICAL THERAPY | Facility: CLINIC | Age: 45
End: 2025-07-07
Payer: COMMERCIAL

## 2025-07-07 DIAGNOSIS — R26.9 GAIT DIFFICULTY: Primary | ICD-10-CM

## 2025-07-07 DIAGNOSIS — G89.29 CHRONIC LOW BACK PAIN: ICD-10-CM

## 2025-07-07 DIAGNOSIS — G89.29 CHRONIC LEFT-SIDED LOW BACK PAIN WITHOUT SCIATICA: ICD-10-CM

## 2025-07-07 DIAGNOSIS — R29.3 ABNORMAL POSTURE: ICD-10-CM

## 2025-07-07 DIAGNOSIS — M54.50 CHRONIC LEFT-SIDED LOW BACK PAIN WITHOUT SCIATICA: ICD-10-CM

## 2025-07-07 DIAGNOSIS — M54.50 CHRONIC LOW BACK PAIN: ICD-10-CM

## 2025-07-07 PROCEDURE — 97140 MANUAL THERAPY 1/> REGIONS: CPT | Mod: GP | Performed by: PHYSICAL THERAPIST

## 2025-07-07 PROCEDURE — 97162 PT EVAL MOD COMPLEX 30 MIN: CPT | Mod: GP | Performed by: PHYSICAL THERAPIST

## 2025-07-07 PROCEDURE — 97110 THERAPEUTIC EXERCISES: CPT | Mod: GP | Performed by: PHYSICAL THERAPIST

## 2025-07-07 ASSESSMENT — ENCOUNTER SYMPTOMS
LOSS OF SENSATION IN FEET: 0
DEPRESSION: 0
OCCASIONAL FEELINGS OF UNSTEADINESS: 0

## 2025-07-07 ASSESSMENT — ACTIVITIES OF DAILY LIVING (ADL): ADL_ASSISTANCE: INDEPENDENT

## 2025-07-07 ASSESSMENT — PATIENT HEALTH QUESTIONNAIRE - PHQ9
1. LITTLE INTEREST OR PLEASURE IN DOING THINGS: MORE THAN HALF THE DAYS
SUM OF ALL RESPONSES TO PHQ9 QUESTIONS 1 AND 2: 2
2. FEELING DOWN, DEPRESSED OR HOPELESS: NOT AT ALL

## 2025-07-07 ASSESSMENT — COLUMBIA-SUICIDE SEVERITY RATING SCALE - C-SSRS
6. HAVE YOU EVER DONE ANYTHING, STARTED TO DO ANYTHING, OR PREPARED TO DO ANYTHING TO END YOUR LIFE?: NO
2. HAVE YOU ACTUALLY HAD ANY THOUGHTS OF KILLING YOURSELF?: NO
1. IN THE PAST MONTH, HAVE YOU WISHED YOU WERE DEAD OR WISHED YOU COULD GO TO SLEEP AND NOT WAKE UP?: NO

## 2025-07-07 NOTE — PROGRESS NOTES
Physical Therapy  Physical Therapy Evaluation    Patient Name: Marilou Singh  MRN: [id number]  Today's Date: 7/7/2025    Time Calculation  Start Time: 1515  Stop Time: 1600  Time Calculation (min): 45 min    General  Reason for Referral: R26.9 Gait Difficulty; M54.50, G89.29 chronic left sided low back pain without sciatica; R29.3 Abnormal Posture  Referred By: Dr. Beni Campos  Past Medical History Relevant to Rehab: Thyroid Cancer; HTN; Headaches and migraines; Multiple Sclerosis; Rheumatoid Arthritis; thyroid disorder; DJD; transverse myelitis; Neuropathy; Asherman Syndrome; Thyroid Sx deleted; appendectomy  Family/Caregiver Present: No  General Comment: Onset Date: 6/2/25; Visit # 1/12 Recheck at Visit #10;  07/01/2025: NO AUTH, 6000 DED-MET, 75% COVERAGE, 20V PT 6650 OOP-MET, [employer]     1. Gait difficulty  Follow Up In Physical Therapy      2. Chronic left-sided low back pain without sciatica  Referral to Physical Therapy    Follow Up In Physical Therapy      3. Chronic low back pain  Follow Up In Physical Therapy      4. Abnormal posture  Follow Up In Physical Therapy          Current Problem: Low back pain with left > right radicular symptoms    Precautions:  Precautions  STEADI Fall Risk Score (The score of 4 or more indicates an increased risk of falling): 1  Precautions Comment: Multiple Sclerosis; HTN      Assessment:  Patient is a 45  year female old who presents to Physical therapy secondary to low back pain with left > right radicular leg pain. Upon PT evaluation the patient is presenting with the following deficits: decreased hip strength, decreased core strength, decreased flexibility, decreased and painful lumbar range of motion, abnormal posture and alignment, increase muscle tightness and tone.   The above deficits are limiting patient's ability to walk, descend steps, stand for periods of time, bend, lift and carry.  Secondary to the above deficits the patient will benefit from skilled PT  intervention to allow the patient to progress to the goal of abolishing pain and having no limitation with standing or walking 8 hours at work.  PT will monitor progress towards goals and adjust intervention as appropriate.      PT Assessment  PT Assessment Results: Decreased strength, Decreased range of motion, Decreased endurance, Decreased mobility, Pain  Rehab Prognosis: Excellent  Barriers to Discharge: none  Evaluation/Treatment Tolerance: Patient tolerated treatment well    Medical History Form: Reviewed and scanned into chart     Plan:  POC 1/12- continue 2X/week - recheck at visit #10 - alignment correction with muscle energy and Hesch method, soft tissue mobilization and myofascial release, back stretching and strengthening. Determine flexion or extension bias.     OP PT Plan  Treatment/Interventions: Cryotherapy, Dry needling, Education/ Instruction, Electrical stimulation, Gait training, Hot pack, Manual therapy, Mechanical traction, Neuromuscular re-education, Self care/ home management, Taping techniques, Therapeutic activities, Therapeutic exercises, Ultrasound  PT Plan: Skilled PT  PT Frequency: 2 times per week  Duration: 6 weeks  Onset Date: 06/02/25  Rehab Potential: Good  Plan of Care Agreement: Patient  Plan of care discussed with patient and patient agrees with plan    Subjective:    Client reports she has low back pain that began about 1 year ago and worsened around January of this year. It hurt one week then stopped. She reports she is able to move, but her back hurts.  Saw the Doctor and they did X-rays and she was given muscle relaxants which has helped. Now the back pain is intermittent. She notes she will get back and radiating sharp pain left > right traveling into the posterior leg with stepping on that leg at times. She notes increased pain by end of work day.     Job: Client is , on feel all day on concrete floors.     Pain     Currently pain is intermittent. Getting  sharp pain with walking.   High pain levels at 8/10, low pain levels 0/10. 8/10 pain today with steps.   Pain aggravating Factors: walking, descending steps,   Pain relieving factors: sits down and rests    Red Flags: Do you have any of the following? Thyroid cancer (no to all others)   Fever/chills, unexplained weight changes, dizziness/fainting, unexplained change in bowel or bladder functions, unexplained malaise or muscle weakness, night pain/sweats, numbness or tingling, history of CA.    Previous Interventions/Treatments:  none  Prior Testing: X-ray - L5-S1 facet joint arthropathy (Information taken from X-ray report)     Prior Function Per Pt/Caregiver Report  Level of [place]: Independent with ADLs and functional transfers  ADL Assistance: Independent   Home Living  Home Living Comment: Lives 2 story home, with her  and 2 children age 4 and age 8.    Primary Language: Indian, English secondary   Patient's goal for therapy: To not have the pain anymore, be able to walk without pain   Social Determinants of Health: none    Objective:   POSTURE: Fair sitting posture with moderate forward head and shoulders    MOLLY assessment   Left shoulder depressed  Right shoulder medial rotation  B knees hyperextended  B knees valgus   ]Left thoracic rotation  Right scapula elevated  Right scapula abducted and upward rotation  Left anterior rotatin  Right posterior rotation  Right upslip  Left downslip  Left hip medial rotation  Right hip lateral rotation    Lumbar Palpation/Joint Mobility Assessment  Positive tightness and tenderness with palpation to B lumbar paraspinals, B superior gluteals, B piriformis, B hip flexors, B thoracic paraspinals, right rhomboids and middle trap and upper trapezius.     Lumbar AROM  Lumbar flexion: (60°): WFL  Lumbar extension (25°): 30  Lumbar rotation right (30°): 25 %loss  Lumbar rotation left (30°): 25 % loss  Lumbar sidebend right (25°): 50% loss  Lumbar sidebend left (25°):  50% loss    Lumbar rotation and sidebending mild soreness.    Hip AROM   Hip extension=10 degrees   Hip IR B=35  Hip ER B=45 degrees    Dermatomes  L1-S1 intact to light touch.    Specific Lower Extremity MMT  Hip flexion right=4/5  Hip flexion left=4/5  Hip abduction right=4/5  Hip abduction left=4/5  Hip adduction right=5/5  Hip adduction left=5/5  Hip extension right=4/5  Hip extension left=4/5  Knee extension right=5/5  Knee extension left=5/5  Knee flexion right=5/5  Knee flexion left=5/5  Ankle plantarflexion right=5/5  Ankle plantarflexion left=5/5  Ankle dorsiflexion right=5/5  Ankle dorsiflexion left=5/5  Ankle inversion right=5/5  Ankle inversion left=5/5  Ankle eversion right=5/5  Ankle eversion left= 5/5    Abdominals and low back grossly=4/5    Special Tests    Negative Slump    Outcome Measures:  Other Measures  30 Second Sit to Stand: 13  Oswestry Disablity Index (FILEMON): 4  Other Outcome Measures: AM-PAC basic mobility outpatient questionairre raw score 57    Treatment:   Moderate Complexity Eval: 20 minutes    Therapeutic Exercise (37701):   10 MINUTES  Therapeutic exercises completed this date to improve strength and postural control to improve ability to perform functional activities safely.        -[facility] Method: restricted right sided sideglide, right hip extension correction, left posterior ischium correction, left posterior pubic bone correction, left sacral rotation/SB correction- Low load long duration stretch release using creep phenomenon for tissue response to improve alignment     -Muscle Energy: right upslip, B leg pull for lumbar decompression,  right SS stuck in flexion correction to improve alignment    -Soft tissue mobilization/ Myofascial release - prone with pillow under hips, prone cross hand lumbar decompression, left lumbar paraspinal release, left superior gluteal soft tissue mobilization, B lumbar paraspinal soft tissue mobilization to increase soft tissue extensibility and  reduce pain.    Education:  Outpatient Education  Individual(s) Educated: Patient  Education Provided: POC, Home Exercise Program  Diagnosis and Precautions: Lumbar radiculopathy  Risk and Benefits Discussed with Patient/Caregiver/Other: yes  Patient/Caregiver Demonstrated Understanding: yes  Plan of Care Discussed and Agreed Upon: yes  Patient Response to Education: Patient/Caregiver Performed Return Demonstration of Exercises/Activities  Education Comment: Android App Review Source Access Code: Access Code: [email or web address]    Access Code: [email or web address] HEP with handouts provided:  URL: [email or web address]  Date: 07/07/2025  Prepared by: Sabra Gilman    Exercises  - Supine Hamstring Stretch  - 1 x daily - 7 x weekly - 1 sets - 3 reps - 30 hold  - Supine Piriformis Stretch with Leg Straight  - 1 x daily - 7 x weekly - 1 sets - 3 reps - 30 hold    Goals:  Goals were developed with patient input   Active       Mobility       LTG - Patient will demonstrate independence and compliance with safe and appropriate HEP for pain reduction, ROM/flexibility, core and BLE strength and postural correction.        Start:  07/07/25    Expected End:  10/05/25            LTG - Patient will increase core strength by 1 MMT grade for improved lumbar stability with lifting and carrying.       Start:  07/07/25    Expected End:  10/05/25            LTG - Patient will increase hip strength by 1 MMT grade for improved ability to walk 8 hours at work.        Start:  07/07/25    Expected End:  10/05/25            LTG - Patient will improve AM-PAC basic mobility outpatient raw score from 57 to 70 for improved functional mobility.        Start:  07/07/25    Expected End:  10/05/25            LTG - Patient will demonstrate full painfree lumbar range of motion for improved mobility at work.        Start:  07/07/25    Expected End:  10/05/25            LTG - Patient stated goal: Client will be able to work 8 hour day standing and and walking with no  low back pain.        Start:  07/07/25    Expected End:  10/05/25            STG - Patient will demonstrate a sitting posture rating of GOOD for decreased stress on low back with sitting.        Start:  07/07/25    Expected End:  08/04/25            STG - Patient will increase core strength by 1/2 MMT grade for improved back stability with lifting and carrying.       Start:  07/07/25    Expected End:  08/04/25              Sabra Gilman, PT

## 2025-07-09 ENCOUNTER — TREATMENT (OUTPATIENT)
Dept: PHYSICAL THERAPY | Facility: CLINIC | Age: 45
End: 2025-07-09
Payer: COMMERCIAL

## 2025-07-09 DIAGNOSIS — G89.29 CHRONIC LEFT-SIDED LOW BACK PAIN WITHOUT SCIATICA: ICD-10-CM

## 2025-07-09 DIAGNOSIS — R29.3 ABNORMAL POSTURE: ICD-10-CM

## 2025-07-09 DIAGNOSIS — R26.9 GAIT DIFFICULTY: Primary | ICD-10-CM

## 2025-07-09 DIAGNOSIS — G89.29 CHRONIC LOW BACK PAIN: ICD-10-CM

## 2025-07-09 DIAGNOSIS — M54.50 CHRONIC LOW BACK PAIN: ICD-10-CM

## 2025-07-09 DIAGNOSIS — M54.50 CHRONIC LEFT-SIDED LOW BACK PAIN WITHOUT SCIATICA: ICD-10-CM

## 2025-07-09 PROCEDURE — 97110 THERAPEUTIC EXERCISES: CPT | Mod: GP

## 2025-07-09 NOTE — PROGRESS NOTES
"Physical Therapy    Physical Therapy Treatment    Patient Name: Marilou Singh  MRN: [id number]  Today's Date: 7/9/2025    Time Entry:   Time Calculation  Start Time: 0807  Stop Time: 0845  Time Calculation (min): 38 min     PT Therapeutic Procedures Time Entry  Therapeutic Exercise Time Entry: 38                   Assessment:   In-clinic program initiated this date. Reviewed HEP and patient able to complete with good technique. Intermittent verbal cueing required for correct exercise technique. Patient noted no increased pain in low back or radicular pain during therapeutic exercise completed this date or at end of session. Patient did note intermittent clicking in L hip but noted it resolved. Patient would likely continue to benefit from skilled PT services.     Plan:   Progress POC as able and tolerated by patient. Adjust plan as needed.    From eval: \"POC 1/12- continue 2X/week - recheck at visit #10 - alignment correction with muscle energy and Hesch method, soft tissue mobilization and myofascial release, back stretching and strengthening. Determine flexion or extension bias.\"    Current Problem  1. Gait difficulty  Follow Up In Physical Therapy      2. Chronic left-sided low back pain without sciatica  Follow Up In Physical Therapy      3. Chronic low back pain  Follow Up In Physical Therapy      4. Abnormal posture  Follow Up In Physical Therapy          General     General  Reason for Referral: R26.9 Gait Difficulty; M54.50, G89.29 chronic left sided low back pain without sciatica; R29.3 Abnormal Posture  Referred By: Dr. Beni Campos  Past Medical History Relevant to Rehab: Thyroid Cancer; HTN; Headaches and migraines; Multiple Sclerosis; Rheumatoid Arthritis; thyroid disorder; DJD; transverse myelitis; Neuropathy; Asherman Syndrome; Thyroid Sx deleted; appendectomy  Family/Caregiver Present: No  General Comment: Onset Date: 6/2/25; Visit # 2/12 Recheck at Visit #10;  07/01/2025: NO AUTH, 6000 DED-MET, " "75% COVERAGE, 20V PT 6650 OOP-MET, [employer]    Subjective    The patient notes no pain in low back or BLEs at current. Notes pain comes and goes. Notes pain mostly only occurs when she is walking. Felt okay after evaluation. Did not get a chance to try HEP exercises (evaluation was 7/7).     Precautions  Precautions  STEADI Fall Risk Score (The score of 4 or more indicates an increased risk of falling): 1  Precautions Comment: Multiple Sclerosis; HTN    Pain   0/10 pain in low back and BLEs at start of session and end of session. Patient left therapy in no distress.     Objective     Treatments:  Therapeutic Exercise (36902)- 38 min   -Sci fit, seat 10, L1 x 3:30 minutes BUEs and BLEs for active warm up (stopped at 3:30 due to patient reports of soreness in R knee)   Supine/Hooklying  -R/L supine hamstring stretch 20 sec hold x 3 reps each with strap   -R/L piriformis stretch 20 sec hold x 3 reps each   -DKTC 5 sec hold x 10 reps (pt noted no increased pain in back or Les, did note \"clicking\" in L hip towards end of set)  -Hooklying posterior pelvic tilt 2 x 10 reps; VC for technique (no pain)   -Hooklying B hip adduction ball squeeze 2 x 10 reps, 5 sec hold   -Hooklying TA brace x 10 reps, 3 sec hold   -Hooklying B hip abduction against green tband 2 x 10 reps, 5 sec hold     OP EDUCATION:   Correct exercise technique in pain free ROM. Holding any exercise that causes pain. No new HEP given this date.     Goals:  Active       Mobility       LTG - Patient will demonstrate independence and compliance with safe and appropriate HEP for pain reduction, ROM/flexibility, core and BLE strength and postural correction.        Start:  07/07/25    Expected End:  10/05/25            LTG - Patient will increase core strength by 1 MMT grade for improved lumbar stability with lifting and carrying.       Start:  07/07/25    Expected End:  10/05/25            LTG - Patient will increase hip strength by 1 MMT grade for improved ability " to walk 8 hours at work.        Start:  07/07/25    Expected End:  10/05/25            LTG - Patient will improve AM-PAC basic mobility outpatient raw score from 57 to 70 for improved functional mobility.        Start:  07/07/25    Expected End:  10/05/25            LTG - Patient will demonstrate full painfree lumbar range of motion for improved mobility at work.        Start:  07/07/25    Expected End:  10/05/25            LTG - Patient stated goal: Client will be able to work 8 hour day standing and and walking with no low back pain.        Start:  07/07/25    Expected End:  10/05/25            STG - Patient will demonstrate a sitting posture rating of GOOD for decreased stress on low back with sitting.        Start:  07/07/25    Expected End:  08/04/25            STG - Patient will increase core strength by 1/2 MMT grade for improved back stability with lifting and carrying.       Start:  07/07/25    Expected End:  08/04/25

## 2025-07-14 ENCOUNTER — TREATMENT (OUTPATIENT)
Dept: PHYSICAL THERAPY | Facility: CLINIC | Age: 45
End: 2025-07-14
Payer: COMMERCIAL

## 2025-07-14 DIAGNOSIS — R26.9 GAIT DIFFICULTY: Primary | ICD-10-CM

## 2025-07-14 DIAGNOSIS — G89.29 CHRONIC LEFT-SIDED LOW BACK PAIN WITHOUT SCIATICA: ICD-10-CM

## 2025-07-14 DIAGNOSIS — G89.29 CHRONIC LOW BACK PAIN: ICD-10-CM

## 2025-07-14 DIAGNOSIS — M54.50 CHRONIC LEFT-SIDED LOW BACK PAIN WITHOUT SCIATICA: ICD-10-CM

## 2025-07-14 DIAGNOSIS — R29.3 ABNORMAL POSTURE: ICD-10-CM

## 2025-07-14 DIAGNOSIS — M54.50 CHRONIC LOW BACK PAIN: ICD-10-CM

## 2025-07-14 PROCEDURE — 97140 MANUAL THERAPY 1/> REGIONS: CPT | Mod: GP | Performed by: PHYSICAL THERAPIST

## 2025-07-14 PROCEDURE — 97110 THERAPEUTIC EXERCISES: CPT | Mod: GP | Performed by: PHYSICAL THERAPIST

## 2025-07-14 NOTE — PROGRESS NOTES
Physical Therapy                                                                                  Physical Therapy Treatment       Patient name: Marilou Singh  MRN:   [id number]  Today's Date: 7/14/2025     Time Calculation  Start Time: 1205  Stop Time: 1250  Time Calculation (min): 45 min    1. Gait difficulty  Follow Up In Physical Therapy      2. Chronic left-sided low back pain without sciatica  Follow Up In Physical Therapy      3. Chronic low back pain  Follow Up In Physical Therapy      4. Abnormal posture  Follow Up In Physical Therapy          PT  Visit  PT Received On: 07/14/25  Response to Previous Treatment: Patient with no complaints from previous session.  General  Reason for Referral: R26.9 Gait Difficulty; M54.50, G89.29 chronic left sided low back pain without sciatica; R29.3 Abnormal Posture  Referred By: Dr. Beni Campos  Past Medical History Relevant to Rehab: Thyroid Cancer; HTN; Headaches and migraines; Multiple Sclerosis; Rheumatoid Arthritis; thyroid disorder; DJD; transverse myelitis; Neuropathy; Asherman Syndrome; Thyroid Sx deleted; appendectomy  Family/Caregiver Present: No  General Comment: Onset Date: 6/2/25; Visit # 3/12 Recheck at Visit #10;  07/01/2025: NO AUTH, 6000 DED-MET, 75% COVERAGE, 20V PT 6650 OOP-MET, [employer]    Assessment:  Patient completed session today with moderate effort and did not have any difficulty with any exercises today. Corrected LQ alignment with Hesch method and muscle energy techniques with manual releases to the tight muscles of he left low back.  Patient demonstrated improvement with alignment and spring testing with no pain throughout the session. Client is experiencing a decrease in shooting radicular leg pain incidence.  Advanced program with addition of bridges for back strength.  Patient will continue to benefit from PT to improve core and back strength and flexibility  to work towards goals of decreasing pain dn improving functional mobility.       Plan:      POC 3/12- continue 2X/week - recheck at visit #10 - alignment correction with muscle energy and Hesch method, soft tissue mobilization and myofascial release, progress back stretching and strengthening. Assess effect of flexion on back.        Subjective:  Patient reports she was sore in the low back last session. But it only lasted one day.  She notes she does feel the shooting radicular leg pain is occurring less often, but still there intermittent at work. No pain today.    Performing HEP: yes    Precautions  Precautions Comment: Multiple Sclerosis; HTN    Pain   0/10    Objective:   NT    Treatment:    Therapeutic Exercise ([id number]):   25 MINUTES  Therapeutic exercises completed this date to improve strength and postural control and flexibility and range of motion to improve ability to perform functional activities safely.          -Hesch Method: restricted right sided sideglide, B hip extension correction, left posterior ischium correction, left posterior pubic bone correction, inferior sacral glide Low load long duration stretch release using creep phenomenon for tissue response to improve alignment      -supine SKTC 3X hold 30 seconds   -supine hamstring 90-90 stretch 3X hold 30 seconds  -supine piriformis stretch 3X hold 30 seconds  -supine isometric hip adduction with drawing in 10X hold 10 seconds  -supine hip abduction with drawing in 10X hold 10 seconds  -Bridge 10X hold 5 seconds     Exercises not performed this visit:  -Sci fit, seat 10, L1 x 3:30 minutes BUEs and BLEs for active warm up (stopped at 3:30 due to patient reports of soreness in R knee)   Supine/Hooklying  -Hooklying posterior pelvic tilt 2 x 10 reps; VC for technique (no pain)   -Hooklying TA brace x 10 reps, 3 sec hold     Manual Therapy ([id number]):  Manual therapy completed this date to improve tissue and joint mobility to allow for improved body/joint mechanics/ROM and to decrease pain.   20minutes      -Muscle Energy:  B leg  pull for lumbar decompression,  right SS stuck in flexion correction to improve alignment     -Soft tissue mobilization/ Myofascial release - prone with pillow under hips, prone cross hand lumbar decompression, left lumbar paraspinal release, left quadratus lumborum release, B lumbar paraspinal soft tissue mobilization to increase soft tissue extensibility and reduce pain.    Education:   Continue with current HEP: added bridges   URL: [email or web address]  Date: 07/07/2025  Prepared by: Sabra Gilman, PT    Active       Mobility       LTG - Patient will demonstrate independence and compliance with safe and appropriate HEP for pain reduction, ROM/flexibility, core and BLE strength and postural correction.        Start:  07/07/25    Expected End:  10/05/25            LTG - Patient will increase core strength by 1 MMT grade for improved lumbar stability with lifting and carrying.       Start:  07/07/25    Expected End:  10/05/25            LTG - Patient will increase hip strength by 1 MMT grade for improved ability to walk 8 hours at work.        Start:  07/07/25    Expected End:  10/05/25            LTG - Patient will improve AM-PAC basic mobility outpatient raw score from 57 to 70 for improved functional mobility.        Start:  07/07/25    Expected End:  10/05/25            LTG - Patient will demonstrate full painfree lumbar range of motion for improved mobility at work.        Start:  07/07/25    Expected End:  10/05/25            LTG - Patient stated goal: Client will be able to work 8 hour day standing and and walking with no low back pain.        Start:  07/07/25    Expected End:  10/05/25            STG - Patient will demonstrate a sitting posture rating of GOOD for decreased stress on low back with sitting.        Start:  07/07/25    Expected End:  08/04/25            STG - Patient will increase core strength by 1/2 MMT grade for improved back stability with lifting  and carrying.       Start:  07/07/25    Expected End:  08/04/25

## 2025-07-17 ENCOUNTER — TREATMENT (OUTPATIENT)
Dept: PHYSICAL THERAPY | Facility: CLINIC | Age: 45
End: 2025-07-17
Payer: COMMERCIAL

## 2025-07-17 DIAGNOSIS — R26.9 GAIT DIFFICULTY: Primary | ICD-10-CM

## 2025-07-17 DIAGNOSIS — G89.29 CHRONIC LOW BACK PAIN: ICD-10-CM

## 2025-07-17 DIAGNOSIS — M54.50 CHRONIC LEFT-SIDED LOW BACK PAIN WITHOUT SCIATICA: ICD-10-CM

## 2025-07-17 DIAGNOSIS — R29.3 ABNORMAL POSTURE: ICD-10-CM

## 2025-07-17 DIAGNOSIS — M54.50 CHRONIC LOW BACK PAIN: ICD-10-CM

## 2025-07-17 DIAGNOSIS — G89.29 CHRONIC LEFT-SIDED LOW BACK PAIN WITHOUT SCIATICA: ICD-10-CM

## 2025-07-17 PROCEDURE — 97530 THERAPEUTIC ACTIVITIES: CPT | Mod: GP | Performed by: PHYSICAL THERAPIST

## 2025-07-17 PROCEDURE — 97110 THERAPEUTIC EXERCISES: CPT | Mod: GP | Performed by: PHYSICAL THERAPIST

## 2025-07-17 NOTE — PROGRESS NOTES
"Physical Therapy                                                                                  Physical Therapy Treatment       Patient name: Marilou Singh  MRN:   [id number]  Today's Date: 7/17/2025     Time Calculation  Start Time: 0845  Stop Time: 0930  Time Calculation (min): 45 min    1. Gait difficulty  Follow Up In Physical Therapy      2. Chronic left-sided low back pain without sciatica  Follow Up In Physical Therapy      3. Chronic low back pain  Follow Up In Physical Therapy      4. Abnormal posture  Follow Up In Physical Therapy                  Assessment:  Patient with R Pelvic Upslip, corrected with MET Right Leg pull, leg lengths were equal indicating a paradox.  Patient instructed in Roll for Control exercises to help stabilize pelvis.  Instructed to do 3x/day.  Patient may benefit from S-I belt if pain continues.  To progress DLS exercises as tolerated.  Patient will continue to benefit from PT to improve core and back strength and flexibility to work towards goals of decreasing pain and improving functional mobility.      Plan:    POC 3/12- continue 2X/week - recheck at visit #10 - alignment correction with muscle energy and Hesch method, soft tissue mobilization and myofascial release, progress back stretching and strengthening. Assess effect of flexion on back.      Subjective:  Patient reports Lumbo-sacral area is feeling better since starting therapy.  Patient states she has been doing home exercises every other day.  States pain is intermittent and comes on primarily with walking.  She will get a \"sharp\" pain along upper gluteal region/S-I area Left or Right sides.  Pain resolves almost immediately.    Visit #4    Performing HEP: yes       Pain   Currently 0/10 pain level.     Objective:   NT    Treatment:    Therapeutic Act's:  23 minutes (Checked Pelvic alignment, leg lengths, and Forward bend test, and Stork test)  R ASIS slightly higher than L ASIS  R Ischial tube higher than L " tube  Forward bend test Right S-I joint (moved forward further indicating hypomobile side)  + Stork test on Right (S-I did not move down indicating hypomobile side)  Leg lengths equal at medial malleoli in supine (paradox)    Therapeutic Exercise (32068):   20 MINUTES  Therapeutic exercises completed this date to improve strength and postural control and flexibility and range of motion to improve ability to perform functional activities safely.        -Roll for Control exercise with ball between thighs and belt around distal thighs, roll thighs out into ER with isometric abduction into belt 10 sec hold and roll thighs into IR squeezing ball 10 sec hold, repeat x 10 reps total (Patient to do this exercise 2-3x/day  -Supine SKTC 3X hold 30 seconds   -Supine hamstring 90-90 stretch 30 seconds x 3 reps L/R  -Supine piriformis stretch 30 sec hold x 3 reps L/R  -Supine isometric hip adduction with drawing 10 second hold x 10 reps   -Supine hip abduction with drawing 10 second hold x 10 reps  -Bridge with PPT and Hip adduction isometric ball squeeze 5 sec hold x 10 reps     Exercises not performed this visit:  -Sci fit, seat 10, L1 x 3:30 minutes BUEs and BLEs for active warm up (stopped at 3:30 due to patient reports of soreness in R knee)   Supine/Hooklying  -Hooklying posterior pelvic tilt 2 x 10 reps; VC for technique (no pain)   -Hooklying TA brace x 10 reps, 3 sec hold     Manual Therapy ([id number]):    2 minutes  Performed MET for R Upslip - Performed R Leg pull for 10 sec hold x 1 rep with good correction of R Upslip and R Pelvic Posterior rotation.      (Deferred treatment below for today)  -Soft tissue mobilization/ Myofascial release - prone with pillow under hips, prone cross hand lumbar decompression, left lumbar paraspinal release, left quadratus lumborum release, B lumbar paraspinal soft tissue mobilization to increase soft tissue extensibility and reduce pain.    Education:   Continue with current HEP: added  tan   URL: [email or web address]  Date: 07/07/2025  Prepared by: Sabra Mendoza PT    Active       Mobility       LTG - Patient will demonstrate independence and compliance with safe and appropriate HEP for pain reduction, ROM/flexibility, core and BLE strength and postural correction.        Start:  07/07/25    Expected End:  10/05/25            LTG - Patient will increase core strength by 1 MMT grade for improved lumbar stability with lifting and carrying.       Start:  07/07/25    Expected End:  10/05/25            LTG - Patient will increase hip strength by 1 MMT grade for improved ability to walk 8 hours at work.        Start:  07/07/25    Expected End:  10/05/25            LTG - Patient will improve AM-PAC basic mobility outpatient raw score from 57 to 70 for improved functional mobility.        Start:  07/07/25    Expected End:  10/05/25            LTG - Patient will demonstrate full painfree lumbar range of motion for improved mobility at work.        Start:  07/07/25    Expected End:  10/05/25            LTG - Patient stated goal: Client will be able to work 8 hour day standing and and walking with no low back pain.        Start:  07/07/25    Expected End:  10/05/25            STG - Patient will demonstrate a sitting posture rating of GOOD for decreased stress on low back with sitting.        Start:  07/07/25    Expected End:  08/04/25            STG - Patient will increase core strength by 1/2 MMT grade for improved back stability with lifting and carrying.       Start:  07/07/25    Expected End:  08/04/25                     4 3

## 2025-07-21 ENCOUNTER — TREATMENT (OUTPATIENT)
Dept: PHYSICAL THERAPY | Facility: CLINIC | Age: 45
End: 2025-07-21
Payer: COMMERCIAL

## 2025-07-21 DIAGNOSIS — M54.50 CHRONIC LOW BACK PAIN: ICD-10-CM

## 2025-07-21 DIAGNOSIS — G89.29 CHRONIC LOW BACK PAIN: ICD-10-CM

## 2025-07-21 DIAGNOSIS — R29.3 ABNORMAL POSTURE: ICD-10-CM

## 2025-07-21 DIAGNOSIS — G89.29 CHRONIC LEFT-SIDED LOW BACK PAIN WITHOUT SCIATICA: ICD-10-CM

## 2025-07-21 DIAGNOSIS — R26.9 GAIT DIFFICULTY: Primary | ICD-10-CM

## 2025-07-21 DIAGNOSIS — M54.50 CHRONIC LEFT-SIDED LOW BACK PAIN WITHOUT SCIATICA: ICD-10-CM

## 2025-07-21 PROCEDURE — 97110 THERAPEUTIC EXERCISES: CPT | Mod: GP | Performed by: PHYSICAL THERAPIST

## 2025-07-21 NOTE — PROGRESS NOTES
Physical Therapy                                                                                  Physical Therapy Treatment       Patient name: Marilou Singh  MRN:   [id number]  Today's Date: 7/21/2025     Time Calculation  Start Time: 0845  Stop Time: 0930  Time Calculation (min): 45 min  Therapeutic Exercise 40 minutes      1. Gait difficulty  Follow Up In Physical Therapy      2. Chronic left-sided low back pain without sciatica  Follow Up In Physical Therapy      3. Chronic low back pain  Follow Up In Physical Therapy      4. Abnormal posture  Follow Up In Physical Therapy                  Assessment:  Pt was able to perform increased reps of exercises today compared to previous sessions.  Pt was able to complete today's treatment with a moderate effort.  No pain during or after session.  To progress DLS exercises as tolerated.  Patient will continue to benefit from PT to improve core and back strength and flexibility to work towards goals of decreasing pain and improving functional mobility.      Plan:    POC 3/12- continue 2X/week - recheck at visit #10 - alignment correction with muscle energy and Hesch method, soft tissue mobilization and myofascial release, progress back stretching and strengthening. Assess effect of flexion on back.      Subjective:    Pt reports that the frequency and intensity of the pain in her SI region is significantly less since beginning PT.  She experiences 5-6 episodes of brief 1-2 second duration pain in SI region per day. The pain at worst is 1-2/10.  Compliant with HEP daily 1-2 times per day.  No new complaints.  Response to previous session: good    Visit #5       Pain   0/10 pain level upon arrival    Objective:  ASIS, PSIS, iliac crest heights are level and even  Leg length symmetrical in supine         Treatment:    Therapeutic Exercise ([id number]):   40 MINUTES  Therapeutic exercises completed this date to improve strength and postural control and flexibility and range of  motion to improve ability to perform functional activities safely.        -Sci fit, seat 10, L1 x 5 minutes  -Supine SKTC 3X hold 30 seconds  L/R  -Supine hamstring 90-90 stretch 30 seconds x 3 reps L/R  -Supine piriformis stretch 30 sec hold x 3 reps L/R  -Supine isometric hip adduction with drawing 10 second hold x 15 reps   -Bridge with PPT and Hip adduction isometric ball squeeze 5 sec hold x 15 reps   -MIP with band above knees   -Supine hip abduction with drawing 10 second hold x 15 reps with orange band  -Leg lengthener x 10 L/R performed unilaterally and 10 reps performed bilaterally  -Isometric abdominal brace pressing into ball x 10 (5 second hold)    Sidelying:  Clamshells with orange band above knees x 15 L/R    Education:   Continue with current HEP including bridges   URL: [email or web address]  Date: 07/07/2025  Prepared by: Sabra Gilman        Active       Mobility       LTG - Patient will demonstrate independence and compliance with safe and appropriate HEP for pain reduction, ROM/flexibility, core and BLE strength and postural correction.        Start:  07/07/25    Expected End:  10/05/25            LTG - Patient will increase core strength by 1 MMT grade for improved lumbar stability with lifting and carrying.       Start:  07/07/25    Expected End:  10/05/25            LTG - Patient will increase hip strength by 1 MMT grade for improved ability to walk 8 hours at work.        Start:  07/07/25    Expected End:  10/05/25            LTG - Patient will improve AM-PAC basic mobility outpatient raw score from 57 to 70 for improved functional mobility.        Start:  07/07/25    Expected End:  10/05/25            LTG - Patient will demonstrate full painfree lumbar range of motion for improved mobility at work.        Start:  07/07/25    Expected End:  10/05/25            LTG - Patient stated goal: Client will be able to work 8 hour day standing and and walking with no low back  pain.        Start:  07/07/25    Expected End:  10/05/25            STG - Patient will demonstrate a sitting posture rating of GOOD for decreased stress on low back with sitting.        Start:  07/07/25    Expected End:  08/04/25            STG - Patient will increase core strength by 1/2 MMT grade for improved back stability with lifting and carrying.       Start:  07/07/25    Expected End:  08/04/25                Enedina Rogers, [place] [id number]

## 2025-07-28 DIAGNOSIS — G43.809 OTHER MIGRAINE WITHOUT STATUS MIGRAINOSUS, NOT INTRACTABLE: Primary | ICD-10-CM

## 2025-07-29 ENCOUNTER — TREATMENT (OUTPATIENT)
Dept: PHYSICAL THERAPY | Facility: CLINIC | Age: 45
End: 2025-07-29
Payer: COMMERCIAL

## 2025-07-29 DIAGNOSIS — G89.29 CHRONIC LOW BACK PAIN: ICD-10-CM

## 2025-07-29 DIAGNOSIS — G89.29 CHRONIC LEFT-SIDED LOW BACK PAIN WITHOUT SCIATICA: ICD-10-CM

## 2025-07-29 DIAGNOSIS — M54.50 CHRONIC LEFT-SIDED LOW BACK PAIN WITHOUT SCIATICA: ICD-10-CM

## 2025-07-29 DIAGNOSIS — R29.3 ABNORMAL POSTURE: ICD-10-CM

## 2025-07-29 DIAGNOSIS — R26.9 GAIT DIFFICULTY: Primary | ICD-10-CM

## 2025-07-29 DIAGNOSIS — M54.50 CHRONIC LOW BACK PAIN: ICD-10-CM

## 2025-07-29 PROCEDURE — 97110 THERAPEUTIC EXERCISES: CPT | Mod: GP,CQ

## 2025-07-29 NOTE — PROGRESS NOTES
Physical Therapy                                                                                  Physical Therapy Treatment       Patient name: Marilou Singh  MRN:   [id number]  Today's Date: 7/29/25     Time Calculation  Start Time: 1634  Stop Time: 1717  Time Calculation (min): 43 min  1. Gait difficulty  Follow Up In Physical Therapy      2. Chronic left-sided low back pain without sciatica  Follow Up In Physical Therapy      3. Chronic low back pain  Follow Up In Physical Therapy      4. Abnormal posture  Follow Up In Physical Therapy             General  Reason for Referral: R26.9 Gait Difficulty; M54.50, G89.29 chronic left sided low back pain without sciatica; R29.3 Abnormal Posture  Referred By: Dr. Beni Campos  Past Medical History Relevant to Rehab: Thyroid Cancer; HTN; Headaches and migraines; Multiple Sclerosis; Rheumatoid Arthritis; thyroid disorder; DJD; transverse myelitis; Neuropathy; Asherman Syndrome; Thyroid Sx deleted; appendectomy  General Comment: Onset Date: 6/2/25; Visit # 4/12 Recheck at Visit #10;  07/01/2025: NO AUTH, 6000 DED-MET, 75% COVERAGE, 20V PT 6650 OOP-MET, [employer]  Precautions Comment: Multiple Sclerosis; HTN   Assessment:  Increase in reps with most exercise performed .Progressing program . Decrease in symptoms   Plan:    OP PT Plan  Treatment/Interventions: Cryotherapy, Dry needling, Education/ Instruction, Electrical stimulation, Gait training, Hot pack, Manual therapy, Mechanical traction, Neuromuscular re-education, Self care/ home management, Taping techniques, Therapeutic activities, Therapeutic exercises, Ultrasound  PT Plan: Skilled PT  PT Frequency: 2 times per week  Duration: 6 weeks  Onset Date: 06/02/25     Subjective: Pt reports that the only pain she has experienced since previous session is some ache she woke with felt in toes of L foot subsided . No notable symptoms since previous session in SI area. No pain  Response to last session: good    Performing HEP:  yes    Pain   0/10  Objective:    Treatment:Therapeutic Exercise 43 minutes     -Sci fit, seat 10, L1 x 8 minutes  -Supine SKTC 4 X hold 30 seconds  L/R  -Supine hamstring 90-90 stretch 30 seconds x 4 reps L/R  -Supine piriformis stretch 30 sec hold x 4 reps L/R  -Supine isometric hip ADduction ball squeeze at knees 10 second hold x 15 reps   -Bridge with PPT and Hip adduction isometric ball squeeze 5 sec hold x 15 reps   -MIP with orange  band above knees 15 reps  -TA bracing  hip abduction with orange band 15 reps no hold ,  15 reps 10 second hold   -Leg lengthener x 15 L/R performed unilaterally and 10 reps performed bilaterally  -Isometric abdominal brace pressing into ball  2 sets of 5 bilaterally (5 second hold)    Education:  Outpatient Education  Education Comment: [facility] Access Code: Access Code: [email or web address]  No additions to HEP    Adwoa Burgos, PTA    Active       Mobility       LTG - Patient will demonstrate independence and compliance with safe and appropriate HEP for pain reduction, ROM/flexibility, core and BLE strength and postural correction.        Start:  07/07/25    Expected End:  10/05/25            LTG - Patient will increase core strength by 1 MMT grade for improved lumbar stability with lifting and carrying.       Start:  07/07/25    Expected End:  10/05/25            LTG - Patient will increase hip strength by 1 MMT grade for improved ability to walk 8 hours at work.        Start:  07/07/25    Expected End:  10/05/25            LTG - Patient will improve AM-PAC basic mobility outpatient raw score from 57 to 70 for improved functional mobility.        Start:  07/07/25    Expected End:  10/05/25            LTG - Patient will demonstrate full painfree lumbar range of motion for improved mobility at work.        Start:  07/07/25    Expected End:  10/05/25            LTG - Patient stated goal: Client will be able to work 8 hour day standing and and walking with no low back pain.         Start:  07/07/25    Expected End:  10/05/25            STG - Patient will demonstrate a sitting posture rating of GOOD for decreased stress on low back with sitting.        Start:  07/07/25    Expected End:  08/04/25            STG - Patient will increase core strength by 1/2 MMT grade for improved back stability with lifting and carrying.       Start:  07/07/25    Expected End:  08/04/25

## 2025-07-30 ENCOUNTER — TELEPHONE (OUTPATIENT)
Facility: CLINIC | Age: 45
End: 2025-07-30
Payer: COMMERCIAL

## 2025-07-30 NOTE — TELEPHONE ENCOUNTER
Marilou Singh   1980   [id number]   [phone]       Change patient appt from in office to virtual visit per MD.

## 2025-07-31 ENCOUNTER — TREATMENT (OUTPATIENT)
Dept: PHYSICAL THERAPY | Facility: CLINIC | Age: 45
End: 2025-07-31
Payer: COMMERCIAL

## 2025-07-31 DIAGNOSIS — G89.29 CHRONIC LOW BACK PAIN: ICD-10-CM

## 2025-07-31 DIAGNOSIS — E03.9 HYPOTHYROIDISM, UNSPECIFIED TYPE: ICD-10-CM

## 2025-07-31 DIAGNOSIS — M54.50 CHRONIC LEFT-SIDED LOW BACK PAIN WITHOUT SCIATICA: ICD-10-CM

## 2025-07-31 DIAGNOSIS — G89.29 CHRONIC LEFT-SIDED LOW BACK PAIN WITHOUT SCIATICA: ICD-10-CM

## 2025-07-31 DIAGNOSIS — R26.9 GAIT DIFFICULTY: Primary | ICD-10-CM

## 2025-07-31 DIAGNOSIS — M54.50 CHRONIC LOW BACK PAIN: ICD-10-CM

## 2025-07-31 DIAGNOSIS — R29.3 ABNORMAL POSTURE: ICD-10-CM

## 2025-07-31 PROCEDURE — 97140 MANUAL THERAPY 1/> REGIONS: CPT | Mod: GP | Performed by: PHYSICAL THERAPIST

## 2025-07-31 PROCEDURE — 97110 THERAPEUTIC EXERCISES: CPT | Mod: GP | Performed by: PHYSICAL THERAPIST

## 2025-07-31 RX ORDER — LEVOTHYROXINE SODIUM 150 UG/1
150 TABLET ORAL DAILY
Qty: 90 TABLET | Refills: 0 | Status: SHIPPED | OUTPATIENT
Start: 2025-07-31

## 2025-07-31 NOTE — PROGRESS NOTES
Physical Therapy                                                                                  Physical Therapy Treatment       Patient name: Marilou Singh  MRN:   [id number]  Today's Date: 7/31/2025     Time Calculation  Start Time: 0715  Stop Time: 0800  Time Calculation (min): 45 min    1. Gait difficulty  Follow Up In Physical Therapy      2. Chronic left-sided low back pain without sciatica  Follow Up In Physical Therapy      3. Chronic low back pain  Follow Up In Physical Therapy      4. Abnormal posture  Follow Up In Physical Therapy          PT  Visit  PT Received On: 07/31/25  Response to Previous Treatment: Patient with no complaints from previous session.  General  Reason for Referral: R26.9 Gait Difficulty; M54.50, G89.29 chronic left sided low back pain without sciatica; R29.3 Abnormal Posture  Referred By: Dr. Beni Campos  Past Medical History Relevant to Rehab: Thyroid Cancer; HTN; Headaches and migraines; Multiple Sclerosis; Rheumatoid Arthritis; thyroid disorder; DJD; transverse myelitis; Neuropathy; Asherman Syndrome; Thyroid Sx deleted; appendectomy  General Comment: Onset Date: 6/2/25; Visit # 7/12 Recheck at Visit #10;  07/01/2025: NO AUTH, 6000 DED-MET, 75% COVERAGE, 20V PT 6650 OOP-MET, [employer]    Assessment:  Patient completed session today with moderate effort and had no difficulty with all exercises and progression of strengthening.  Patient demonstrated improvement with improved sitting posture rating to GOOD and improved LE and core strength by 1/2 MT grade meeting STG for strength.   Patient will continue to benefit from PT to improve back and LE flexibility and core and back strength  to work towards goals of decreasing pain and improving function.      Plan:    POC 7/12- continue 2X/week - recheck at visit #9 for possible DC due to having script for her neck and migraines and would like to start care for that - alignment correction with muscle energy and Hesch method, soft tissue  mobilization and myofascial release, progress back stretching and strengthening. Assess effect of flexion on back        Subjective:  Patient reports she is doing well.   Patient reports she is only feeling pain with lifting something heavy . She notes hs referral for PT for neck/migraines.  She notes if back continues to do good, would like to get set up on HEP and discharge in about 2 visits, so she can start therapy on her neck.    Response to last session:  PT  Visit  PT Received On: 07/31/25  Response to Previous Treatment: Patient with no complaints from previous session.    Performing HEP: yes    Precautions  Precautions Comment: Multiple Sclerosis; HTN    Pain   0/10 Low back pain today    Objective:     Hip flexion right=4+/5  Hip flexion left=4+/5  Hip abduction right=4+/5  Hip abduction left=4+/5  Hip adduction right=5/5  Hip adduction left=5/5  Hip extension right=4/5  Hip extension left=4+/5  Knee extension right=5/5  Knee extension left=5/5  Knee flexion right=5/5  Knee flexion left=5/5  Ankle plantarflexion right=5/5  Ankle plantarflexion left=5/5  Ankle dorsiflexion right=5/5  Ankle dorsiflexion left=5/5  Ankle inversion right=5/5  Ankle inversion left=5/5  Ankle eversion right=5/5  Ankle eversion left= 5/5     Abdominals and low back grossly=4+/5    POSTURE: GOOD sitting posture rating    Treatment:    Therapeutic Exercise (77669):   30 MINUTES  Therapeutic exercises completed this date to improve strength and postural control to improve ability to perform functional activities safely.           -[facility] Method: restricted right sided sideglide, B hip extension correction, , inferior sacral glide Low load long duration stretch release using creep phenomenon for tissue response to improve alignment     -supine SKTC 3X hold 30 seconds   -supine hamstring 90-90 stretch 3X hold 30 seconds  -SLR with abdominal brace 10X hold 3 seconds  -MIP with orange  band above knees 15 reps hold 5 seconds    Ally  -quadruped leg raises 10X hold 5 seconds  -prone hip extension with pillow under the hips 15X hold 5 seconds    Exercises not performed today:   -Sci fit, seat 10, L1 x 8 minutes  -supine piriformis stretch 3X hold 30 seconds  -supine isometric hip adduction with drawing in 10X hold 10 seconds  -supine hip abduction with drawing in 10X hold 10 seconds  -Bridge 10X hold 5 seconds  -TA bracing  hip abduction with orange band 15 reps no hold ,  15 reps 10 second hold   -Leg lengthener x 15 L/R performed unilaterally and 10 reps performed bilater    Manual Therapy (73725):  Manual therapy completed this date to improve tissue and joint mobility to allow for improved body/joint mechanics/ROM and to decrease pain.   15minutes      -Mus[employer] Energy:  right upslip correction, B leg pull for lumbar decompression,  to improve alignment     -Soft tissue mobilization/ Myofascial release - prone with pillow under hips, prone cross hand lumbar decompression, left lumbar paraspinal release, left quadratus lumborum release, B lumbar paraspinal soft tissue mobilization to increase soft tissue extensibility and reduce pain.    Education:   URL: [email or web address]  : [email or web address]   Date: 07/31/2025  Prepared by: Sabra Gilman  Updated handouts provided    Sabra Gilman, PT    Active       Mobility       LTG - Patient will demonstrate independence and compliance with safe and appropriate HEP for pain reduction, ROM/flexibility, core and BLE strength and postural correction.        Start:  07/07/25    Expected End:  10/05/25            LTG - Patient will increase core strength by 1 MMT grade for improved lumbar stability with lifting and carrying.       Start:  07/07/25    Expected End:  10/05/25            LTG - Patient will increase hip strength by 1 MMT grade for improved ability to walk 8 hours at work.        Start:  07/07/25    Expected End:  10/05/25            LTG - Patient will improve AM-PAC basic  mobility outpatient raw score from 57 to 70 for improved functional mobility.        Start:  07/07/25    Expected End:  10/05/25            LTG - Patient will demonstrate full painfree lumbar range of motion for improved mobility at work.        Start:  07/07/25    Expected End:  10/05/25            LTG - Patient stated goal: Client will be able to work 8 hour day standing and and walking with no low back pain.        Start:  07/07/25    Expected End:  10/05/25            STG - Patient will demonstrate a sitting posture rating of GOOD for decreased stress on low back with sitting.  (Met)       Start:  07/07/25    Expected End:  08/04/25    Resolved:  07/31/25         STG - Patient will increase core strength by 1/2 MMT grade for improved back stability with lifting and carrying. (Met)       Start:  07/07/25    Expected End:  08/04/25    Resolved:  07/31/25                  Sabra Gilman, PT

## 2025-08-06 ENCOUNTER — APPOINTMENT (OUTPATIENT)
Dept: PHYSICAL THERAPY | Facility: CLINIC | Age: 45
End: 2025-08-06
Payer: COMMERCIAL

## 2025-08-06 DIAGNOSIS — M54.50 CHRONIC LEFT-SIDED LOW BACK PAIN WITHOUT SCIATICA: ICD-10-CM

## 2025-08-06 DIAGNOSIS — R29.3 ABNORMAL POSTURE: ICD-10-CM

## 2025-08-06 DIAGNOSIS — G89.29 CHRONIC LOW BACK PAIN: ICD-10-CM

## 2025-08-06 DIAGNOSIS — M54.50 CHRONIC LOW BACK PAIN: ICD-10-CM

## 2025-08-06 DIAGNOSIS — R26.9 GAIT DIFFICULTY: Primary | ICD-10-CM

## 2025-08-06 DIAGNOSIS — G89.29 CHRONIC LEFT-SIDED LOW BACK PAIN WITHOUT SCIATICA: ICD-10-CM

## 2025-08-11 ENCOUNTER — APPOINTMENT (OUTPATIENT)
Dept: PHYSICAL THERAPY | Facility: CLINIC | Age: 45
End: 2025-08-11
Payer: COMMERCIAL

## 2025-08-11 DIAGNOSIS — M54.50 CHRONIC LOW BACK PAIN: ICD-10-CM

## 2025-08-11 DIAGNOSIS — R26.9 GAIT DIFFICULTY: Primary | ICD-10-CM

## 2025-08-11 DIAGNOSIS — G89.29 CHRONIC LEFT-SIDED LOW BACK PAIN WITHOUT SCIATICA: ICD-10-CM

## 2025-08-11 DIAGNOSIS — R29.3 ABNORMAL POSTURE: ICD-10-CM

## 2025-08-11 DIAGNOSIS — G89.29 CHRONIC LOW BACK PAIN: ICD-10-CM

## 2025-08-11 DIAGNOSIS — M54.50 CHRONIC LEFT-SIDED LOW BACK PAIN WITHOUT SCIATICA: ICD-10-CM

## 2025-08-13 ENCOUNTER — APPOINTMENT (OUTPATIENT)
Dept: PHYSICAL THERAPY | Facility: CLINIC | Age: 45
End: 2025-08-13
Payer: COMMERCIAL

## 2025-08-13 DIAGNOSIS — R26.9 GAIT DIFFICULTY: Primary | ICD-10-CM

## 2025-08-13 DIAGNOSIS — M54.50 CHRONIC LOW BACK PAIN: ICD-10-CM

## 2025-08-13 DIAGNOSIS — G89.29 CHRONIC LEFT-SIDED LOW BACK PAIN WITHOUT SCIATICA: ICD-10-CM

## 2025-08-13 DIAGNOSIS — M54.50 CHRONIC LEFT-SIDED LOW BACK PAIN WITHOUT SCIATICA: ICD-10-CM

## 2025-08-13 DIAGNOSIS — R29.3 ABNORMAL POSTURE: ICD-10-CM

## 2025-08-13 DIAGNOSIS — G89.29 CHRONIC LOW BACK PAIN: ICD-10-CM

## 2025-08-17 ENCOUNTER — RESULTS FOLLOW-UP (OUTPATIENT)
Facility: CLINIC | Age: 45
End: 2025-08-17
Payer: COMMERCIAL

## 2025-08-19 ENCOUNTER — APPOINTMENT (OUTPATIENT)
Dept: PHYSICAL THERAPY | Facility: CLINIC | Age: 45
End: 2025-08-19
Payer: COMMERCIAL

## 2025-08-19 ENCOUNTER — TELEMEDICINE (OUTPATIENT)
Facility: CLINIC | Age: 45
End: 2025-08-19
Payer: COMMERCIAL

## 2025-08-19 DIAGNOSIS — M54.50 CHRONIC LEFT-SIDED LOW BACK PAIN WITHOUT SCIATICA: ICD-10-CM

## 2025-08-19 DIAGNOSIS — E89.0 POSTOPERATIVE HYPOTHYROIDISM: ICD-10-CM

## 2025-08-19 DIAGNOSIS — R26.9 GAIT DIFFICULTY: Primary | ICD-10-CM

## 2025-08-19 DIAGNOSIS — G89.29 CHRONIC LOW BACK PAIN: ICD-10-CM

## 2025-08-19 DIAGNOSIS — M54.50 CHRONIC LOW BACK PAIN: ICD-10-CM

## 2025-08-19 DIAGNOSIS — R29.3 ABNORMAL POSTURE: ICD-10-CM

## 2025-08-19 DIAGNOSIS — G89.29 CHRONIC LEFT-SIDED LOW BACK PAIN WITHOUT SCIATICA: ICD-10-CM

## 2025-08-19 DIAGNOSIS — C73 THYROID CANCER (MULTI): Primary | ICD-10-CM

## 2025-08-19 PROCEDURE — 1036F TOBACCO NON-USER: CPT | Performed by: INTERNAL MEDICINE

## 2025-08-19 PROCEDURE — 99213 OFFICE O/P EST LOW 20 MIN: CPT | Performed by: INTERNAL MEDICINE

## 2025-08-19 ASSESSMENT — ENCOUNTER SYMPTOMS
NAUSEA: 0
FEVER: 0
SHORTNESS OF BREATH: 0
DIARRHEA: 0
COUGH: 0
PALPITATIONS: 0
CHILLS: 0
VOMITING: 0
HEADACHES: 0
FATIGUE: 0
OCCASIONAL FEELINGS OF UNSTEADINESS: 0
LOSS OF SENSATION IN FEET: 0

## 2025-08-19 ASSESSMENT — PATIENT HEALTH QUESTIONNAIRE - PHQ9
1. LITTLE INTEREST OR PLEASURE IN DOING THINGS: NOT AT ALL
2. FEELING DOWN, DEPRESSED OR HOPELESS: NOT AT ALL
SUM OF ALL RESPONSES TO PHQ9 QUESTIONS 1 AND 2: 0

## 2025-08-22 ENCOUNTER — APPOINTMENT (OUTPATIENT)
Dept: PHYSICAL THERAPY | Facility: CLINIC | Age: 45
End: 2025-08-22
Payer: COMMERCIAL

## 2025-08-22 DIAGNOSIS — M54.50 CHRONIC LEFT-SIDED LOW BACK PAIN WITHOUT SCIATICA: ICD-10-CM

## 2025-08-22 DIAGNOSIS — M54.50 CHRONIC LOW BACK PAIN: ICD-10-CM

## 2025-08-22 DIAGNOSIS — R26.9 GAIT DIFFICULTY: Primary | ICD-10-CM

## 2025-08-22 DIAGNOSIS — G89.29 CHRONIC LEFT-SIDED LOW BACK PAIN WITHOUT SCIATICA: ICD-10-CM

## 2025-08-22 DIAGNOSIS — R29.3 ABNORMAL POSTURE: ICD-10-CM

## 2025-08-22 DIAGNOSIS — G89.29 CHRONIC LOW BACK PAIN: ICD-10-CM

## 2025-08-27 ENCOUNTER — TREATMENT (OUTPATIENT)
Dept: PHYSICAL THERAPY | Facility: CLINIC | Age: 45
End: 2025-08-27
Payer: COMMERCIAL

## 2025-08-27 ENCOUNTER — APPOINTMENT (OUTPATIENT)
Dept: PHYSICAL THERAPY | Facility: CLINIC | Age: 45
End: 2025-08-27
Payer: COMMERCIAL

## 2025-08-27 DIAGNOSIS — M54.50 CHRONIC LEFT-SIDED LOW BACK PAIN WITHOUT SCIATICA: ICD-10-CM

## 2025-08-27 DIAGNOSIS — R29.3 ABNORMAL POSTURE: ICD-10-CM

## 2025-08-27 DIAGNOSIS — R26.9 GAIT DIFFICULTY: Primary | ICD-10-CM

## 2025-08-27 DIAGNOSIS — G89.29 CHRONIC LOW BACK PAIN: ICD-10-CM

## 2025-08-27 DIAGNOSIS — G89.29 CHRONIC LEFT-SIDED LOW BACK PAIN WITHOUT SCIATICA: ICD-10-CM

## 2025-08-27 DIAGNOSIS — M54.50 CHRONIC LOW BACK PAIN: ICD-10-CM

## 2025-08-27 PROCEDURE — 97140 MANUAL THERAPY 1/> REGIONS: CPT | Mod: GP | Performed by: PHYSICAL THERAPIST

## 2025-08-27 PROCEDURE — 97110 THERAPEUTIC EXERCISES: CPT | Mod: GP | Performed by: PHYSICAL THERAPIST

## 2025-08-27 PROCEDURE — 97530 THERAPEUTIC ACTIVITIES: CPT | Mod: GP | Performed by: PHYSICAL THERAPIST

## 2025-09-04 ENCOUNTER — APPOINTMENT (OUTPATIENT)
Dept: NEUROLOGY | Facility: HOSPITAL | Age: 45
End: 2025-09-04
Payer: COMMERCIAL

## 2025-09-04 ENCOUNTER — EVALUATION (OUTPATIENT)
Dept: PHYSICAL THERAPY | Facility: CLINIC | Age: 45
End: 2025-09-04
Payer: COMMERCIAL

## 2025-09-04 DIAGNOSIS — G43.809 OTHER MIGRAINE, NOT INTRACTABLE, WITHOUT STATUS MIGRAINOSUS: ICD-10-CM

## 2025-09-04 DIAGNOSIS — M54.2 CERVICALGIA: ICD-10-CM

## 2025-09-04 DIAGNOSIS — R29.3 ABNORMAL POSTURE: Primary | ICD-10-CM

## 2025-09-04 PROCEDURE — 97110 THERAPEUTIC EXERCISES: CPT | Mod: GP | Performed by: PHYSICAL THERAPIST

## 2025-09-04 PROCEDURE — 97161 PT EVAL LOW COMPLEX 20 MIN: CPT | Mod: GP | Performed by: PHYSICAL THERAPIST

## 2025-09-04 PROCEDURE — 97140 MANUAL THERAPY 1/> REGIONS: CPT | Mod: GP | Performed by: PHYSICAL THERAPIST

## 2025-09-04 ASSESSMENT — VISUAL ACUITY: VA_NORMAL: 1

## 2025-11-10 ENCOUNTER — APPOINTMENT (OUTPATIENT)
Dept: PRIMARY CARE | Facility: CLINIC | Age: 45
End: 2025-11-10
Payer: COMMERCIAL

## 2025-11-10 ENCOUNTER — APPOINTMENT (OUTPATIENT)
Facility: CLINIC | Age: 45
End: 2025-11-10
Payer: COMMERCIAL

## 2026-06-08 ENCOUNTER — APPOINTMENT (OUTPATIENT)
Facility: CLINIC | Age: 46
End: 2026-06-08
Payer: COMMERCIAL